# Patient Record
Sex: MALE | Race: WHITE | NOT HISPANIC OR LATINO | URBAN - METROPOLITAN AREA
[De-identification: names, ages, dates, MRNs, and addresses within clinical notes are randomized per-mention and may not be internally consistent; named-entity substitution may affect disease eponyms.]

---

## 2017-11-21 ENCOUNTER — APPOINTMENT (RX ONLY)
Dept: URBAN - METROPOLITAN AREA CLINIC 116 | Facility: CLINIC | Age: 67
Setting detail: DERMATOLOGY
End: 2017-11-21

## 2017-11-21 DIAGNOSIS — L57.0 ACTINIC KERATOSIS: ICD-10-CM

## 2017-11-21 DIAGNOSIS — L56.5 DISSEMINATED SUPERFICIAL ACTINIC POROKERATOSIS (DSAP): ICD-10-CM

## 2017-11-21 DIAGNOSIS — L81.4 OTHER MELANIN HYPERPIGMENTATION: ICD-10-CM

## 2017-11-21 PROBLEM — Q82.8 OTHER SPECIFIED CONGENITAL MALFORMATIONS OF SKIN: Status: ACTIVE | Noted: 2017-11-21

## 2017-11-21 PROCEDURE — ? LIQUID NITROGEN

## 2017-11-21 PROCEDURE — 99214 OFFICE O/P EST MOD 30 MIN: CPT | Mod: 25

## 2017-11-21 PROCEDURE — 17000 DESTRUCT PREMALG LESION: CPT

## 2017-11-21 PROCEDURE — 17003 DESTRUCT PREMALG LES 2-14: CPT

## 2017-11-21 PROCEDURE — ? COUNSELING

## 2017-11-21 ASSESSMENT — LOCATION DETAILED DESCRIPTION DERM
LOCATION DETAILED: LEFT MEDIAL TEMPLE
LOCATION DETAILED: LEFT DISTAL PRETIBIAL REGION
LOCATION DETAILED: RIGHT DISTAL DORSAL FOREARM
LOCATION DETAILED: RIGHT CENTRAL TEMPLE
LOCATION DETAILED: RIGHT SUPERIOR PREAURICULAR CHEEK
LOCATION DETAILED: LEFT PROXIMAL DORSAL FOREARM
LOCATION DETAILED: STERNUM
LOCATION DETAILED: LEFT SUPERIOR OCCIPITAL SCALP
LOCATION DETAILED: RIGHT SUPERIOR MEDIAL UPPER BACK
LOCATION DETAILED: LEFT PROXIMAL PRETIBIAL REGION
LOCATION DETAILED: LEFT POSTERIOR PARIETAL SCALP
LOCATION DETAILED: RIGHT POSTERIOR PARIETAL SCALP
LOCATION DETAILED: POSTERIOR MID-PARIETAL SCALP
LOCATION DETAILED: MID-OCCIPITAL SCALP
LOCATION DETAILED: LEFT ANTERIOR SHOULDER

## 2017-11-21 ASSESSMENT — LOCATION SIMPLE DESCRIPTION DERM
LOCATION SIMPLE: LEFT TEMPLE
LOCATION SIMPLE: RIGHT TEMPLE
LOCATION SIMPLE: LEFT SHOULDER
LOCATION SIMPLE: RIGHT CHEEK
LOCATION SIMPLE: LEFT FOREARM
LOCATION SIMPLE: RIGHT FOREARM
LOCATION SIMPLE: LEFT PRETIBIAL REGION
LOCATION SIMPLE: POSTERIOR SCALP
LOCATION SIMPLE: CHEST
LOCATION SIMPLE: RIGHT UPPER BACK

## 2017-11-21 ASSESSMENT — LOCATION ZONE DERM
LOCATION ZONE: ARM
LOCATION ZONE: LEG
LOCATION ZONE: TRUNK
LOCATION ZONE: SCALP
LOCATION ZONE: FACE

## 2017-11-21 NOTE — PROCEDURE: LIQUID NITROGEN
Number Of Freeze-Thaw Cycles: 1 freeze-thaw cycle
Duration Of Freeze Thaw-Cycle (Seconds): 5
Render Post-Care Instructions In Note?: no
Consent: The patient's consent was obtained including but not limited to risks of crusting, scabbing, blistering, scarring, darker or lighter pigmentary change, recurrence, incomplete removal and infection.
Detail Level: Detailed
Post-Care Instructions: I reviewed with the patient in detail post-care instructions. Patient is to wear sunprotection, and avoid picking at any of the treated lesions. Pt may apply Vaseline to crusted or scabbing areas.

## 2017-11-21 NOTE — PROCEDURE: MIPS QUALITY
Quality 111:Pneumonia Vaccination Status For Older Adults: Pneumococcal Vaccination Previously Received
Detail Level: Detailed
Quality 131: Pain Assessment And Follow-Up: Pain assessment using a standardized tool is documented as negative, no follow-up plan required
Quality 226: Preventive Care And Screening: Tobacco Use: Screening And Cessation Intervention: Patient screened for tobacco and is an ex-smoker
Quality 130: Documentation Of Current Medications In The Medical Record: Current Medications with Name, Dosage, Frequency, or Route not Documented, Reason not Given
Quality 110: Preventive Care And Screening: Influenza Immunization: Influenza Immunization Administered during Influenza season
Quality 47: Advance Care Plan: Advance Care Planning discussed and documented in the medical record; patient did not wish or was not able to name a surrogate decision maker or provide an advance care plan.

## 2018-07-31 ENCOUNTER — RECORDS - HEALTHEAST (OUTPATIENT)
Dept: ADMINISTRATIVE | Facility: OTHER | Age: 68
End: 2018-07-31

## 2018-11-20 ENCOUNTER — APPOINTMENT (RX ONLY)
Dept: URBAN - METROPOLITAN AREA CLINIC 116 | Facility: CLINIC | Age: 68
Setting detail: DERMATOLOGY
End: 2018-11-20

## 2018-11-20 DIAGNOSIS — L56.8 OTHER SPECIFIED ACUTE SKIN CHANGES DUE TO ULTRAVIOLET RADIATION: ICD-10-CM

## 2018-11-20 DIAGNOSIS — L57.0 ACTINIC KERATOSIS: ICD-10-CM

## 2018-11-20 DIAGNOSIS — D22 MELANOCYTIC NEVI: ICD-10-CM

## 2018-11-20 DIAGNOSIS — L81.4 OTHER MELANIN HYPERPIGMENTATION: ICD-10-CM

## 2018-11-20 DIAGNOSIS — L82.0 INFLAMED SEBORRHEIC KERATOSIS: ICD-10-CM

## 2018-11-20 DIAGNOSIS — L56.5 DISSEMINATED SUPERFICIAL ACTINIC POROKERATOSIS (DSAP): ICD-10-CM

## 2018-11-20 DIAGNOSIS — L82.1 OTHER SEBORRHEIC KERATOSIS: ICD-10-CM

## 2018-11-20 PROBLEM — D22.5 MELANOCYTIC NEVI OF TRUNK: Status: ACTIVE | Noted: 2018-11-20

## 2018-11-20 PROBLEM — D22.72 MELANOCYTIC NEVI OF LEFT LOWER LIMB, INCLUDING HIP: Status: ACTIVE | Noted: 2018-11-20

## 2018-11-20 PROCEDURE — ? COUNSELING

## 2018-11-20 PROCEDURE — ? DIAGNOSIS COMMENT

## 2018-11-20 PROCEDURE — ? OBSERVATION

## 2018-11-20 PROCEDURE — 17000 DESTRUCT PREMALG LESION: CPT

## 2018-11-20 PROCEDURE — 17003 DESTRUCT PREMALG LES 2-14: CPT

## 2018-11-20 PROCEDURE — 99214 OFFICE O/P EST MOD 30 MIN: CPT | Mod: 25

## 2018-11-20 PROCEDURE — ? LIQUID NITROGEN

## 2018-11-20 PROCEDURE — ? ORDER FOR PHOTODYNAMIC THERAPY

## 2018-11-20 ASSESSMENT — LOCATION DETAILED DESCRIPTION DERM
LOCATION DETAILED: MID-OCCIPITAL SCALP
LOCATION DETAILED: LEFT PROXIMAL DORSAL FOREARM
LOCATION DETAILED: LEFT SUPERIOR LATERAL NECK
LOCATION DETAILED: RIGHT PROXIMAL DORSAL FOREARM
LOCATION DETAILED: LEFT LATERAL FOREHEAD
LOCATION DETAILED: RIGHT INFERIOR CENTRAL MALAR CHEEK
LOCATION DETAILED: RIGHT ANTERIOR PROXIMAL THIGH
LOCATION DETAILED: RIGHT INFERIOR LATERAL MALAR CHEEK
LOCATION DETAILED: LEFT LATERAL DISTAL PRETIBIAL REGION
LOCATION DETAILED: LEFT INFERIOR MEDIAL MIDBACK
LOCATION DETAILED: POSTERIOR MID-PARIETAL SCALP
LOCATION DETAILED: RIGHT DISTAL CALF
LOCATION DETAILED: LEFT POSTERIOR PARIETAL SCALP
LOCATION DETAILED: LEFT SUPERIOR POSTERIOR PARIETAL SCALP
LOCATION DETAILED: LEFT DORSAL FOOT
LOCATION DETAILED: LEFT MEDIAL MANDIBULAR CHEEK
LOCATION DETAILED: RIGHT INFERIOR UPPER BACK
LOCATION DETAILED: LEFT SUPERIOR LATERAL FOREHEAD
LOCATION DETAILED: LEFT DISTAL PRETIBIAL REGION
LOCATION DETAILED: RIGHT INFERIOR VERMILION LIP
LOCATION DETAILED: LEFT PROXIMAL POSTERIOR UPPER ARM
LOCATION DETAILED: INFERIOR THORACIC SPINE
LOCATION DETAILED: RIGHT POSTERIOR SHOULDER

## 2018-11-20 ASSESSMENT — LOCATION SIMPLE DESCRIPTION DERM
LOCATION SIMPLE: LEFT UPPER ARM
LOCATION SIMPLE: RIGHT CALF
LOCATION SIMPLE: LEFT FOOT
LOCATION SIMPLE: LEFT ANTERIOR NECK
LOCATION SIMPLE: LEFT FOREARM
LOCATION SIMPLE: LEFT FOREHEAD
LOCATION SIMPLE: RIGHT THIGH
LOCATION SIMPLE: RIGHT SHOULDER
LOCATION SIMPLE: UPPER BACK
LOCATION SIMPLE: POSTERIOR SCALP
LOCATION SIMPLE: LEFT CHEEK
LOCATION SIMPLE: RIGHT UPPER BACK
LOCATION SIMPLE: RIGHT FOREARM
LOCATION SIMPLE: LEFT PRETIBIAL REGION
LOCATION SIMPLE: LEFT LOWER BACK
LOCATION SIMPLE: RIGHT LIP
LOCATION SIMPLE: RIGHT CHEEK

## 2018-11-20 ASSESSMENT — LOCATION ZONE DERM
LOCATION ZONE: FACE
LOCATION ZONE: LIP
LOCATION ZONE: NECK
LOCATION ZONE: ARM
LOCATION ZONE: LEG
LOCATION ZONE: SCALP
LOCATION ZONE: FEET
LOCATION ZONE: TRUNK

## 2018-11-20 NOTE — PROCEDURE: ORDER FOR PHOTODYNAMIC THERAPY
Pdt Type: 417nm LED
Arms And Hands Incubation Time: 2 Hours
Face Incubation Time: 60min
Detail Level: Zone
Location: Face and Scalp
Face And Neck Incubation Time: 1 Hour
Consent: The procedure and risks were reviewed with the patient including but not limited to: burning, pigmentary changes, pain, blistering, scabbing, redness, and the possibility of needing numerous treatments. Strict photoprotection after the procedure was also discussed. No hx of cold sores, fever blisters, or herpes.
Scalp Incubation Time: 90 mins
Face And Scalp Incubation Time: 1 Hour for the face and 1 Hours for the scalp
Frequency Of Pdt: Single Treatment

## 2018-11-20 NOTE — PROCEDURE: LIQUID NITROGEN
Number Of Freeze-Thaw Cycles: 1 freeze-thaw cycle
Post-Care Instructions: I reviewed with the patient in detail post-care instructions. Patient is to wear sunprotection, and avoid picking at any of the treated lesions. Pt may apply Vaseline to crusted or scabbing areas.
Consent: The patient's consent was obtained including but not limited to risks of crusting, scabbing, blistering, scarring, darker or lighter pigmentary change, recurrence, incomplete removal and infection.
Duration Of Freeze Thaw-Cycle (Seconds): 5
Detail Level: Detailed
Render Post-Care Instructions In Note?: no

## 2018-11-20 NOTE — HPI: EVALUATION OF SKIN LESION(S)
How Severe Are Your Spot(S)?: mild
Have Your Spot(S) Been Treated In The Past?: has not been treated
Hpi Title: Evaluation of Skin Lesions
Additional History: Spot on lip that splits while exposed to sun or dry weather. Always in same spot on lower lip.

## 2019-11-26 ENCOUNTER — APPOINTMENT (RX ONLY)
Dept: URBAN - METROPOLITAN AREA CLINIC 116 | Facility: CLINIC | Age: 69
Setting detail: DERMATOLOGY
End: 2019-11-26

## 2019-11-26 DIAGNOSIS — L81.4 OTHER MELANIN HYPERPIGMENTATION: ICD-10-CM

## 2019-11-26 DIAGNOSIS — L74.51 PRIMARY FOCAL HYPERHIDROSIS: ICD-10-CM

## 2019-11-26 DIAGNOSIS — L82.1 OTHER SEBORRHEIC KERATOSIS: ICD-10-CM

## 2019-11-26 DIAGNOSIS — D22 MELANOCYTIC NEVI: ICD-10-CM

## 2019-11-26 DIAGNOSIS — Z85.828 PERSONAL HISTORY OF OTHER MALIGNANT NEOPLASM OF SKIN: ICD-10-CM

## 2019-11-26 DIAGNOSIS — L57.0 ACTINIC KERATOSIS: ICD-10-CM

## 2019-11-26 PROBLEM — L74.519 PRIMARY FOCAL HYPERHIDROSIS, UNSPECIFIED: Status: ACTIVE | Noted: 2019-11-26

## 2019-11-26 PROBLEM — D22.5 MELANOCYTIC NEVI OF TRUNK: Status: ACTIVE | Noted: 2019-11-26

## 2019-11-26 PROCEDURE — ? LIQUID NITROGEN

## 2019-11-26 PROCEDURE — ? OTHER

## 2019-11-26 PROCEDURE — ? COUNSELING

## 2019-11-26 PROCEDURE — 99214 OFFICE O/P EST MOD 30 MIN: CPT | Mod: 25

## 2019-11-26 PROCEDURE — 17003 DESTRUCT PREMALG LES 2-14: CPT

## 2019-11-26 PROCEDURE — 17000 DESTRUCT PREMALG LESION: CPT

## 2019-11-26 ASSESSMENT — LOCATION SIMPLE DESCRIPTION DERM
LOCATION SIMPLE: UPPER BACK
LOCATION SIMPLE: RIGHT BUTTOCK
LOCATION SIMPLE: RIGHT POSTERIOR THIGH
LOCATION SIMPLE: RIGHT THIGH
LOCATION SIMPLE: LEFT SCALP
LOCATION SIMPLE: SCALP
LOCATION SIMPLE: RIGHT FOREARM
LOCATION SIMPLE: CHEST
LOCATION SIMPLE: RIGHT UPPER BACK

## 2019-11-26 ASSESSMENT — LOCATION ZONE DERM
LOCATION ZONE: ARM
LOCATION ZONE: SCALP
LOCATION ZONE: LEG
LOCATION ZONE: TRUNK

## 2019-11-26 ASSESSMENT — LOCATION DETAILED DESCRIPTION DERM
LOCATION DETAILED: RIGHT INFERIOR UPPER BACK
LOCATION DETAILED: LEFT CENTRAL FRONTAL SCALP
LOCATION DETAILED: RIGHT ANTERIOR PROXIMAL THIGH
LOCATION DETAILED: INFERIOR THORACIC SPINE
LOCATION DETAILED: RIGHT BUTTOCK
LOCATION DETAILED: RIGHT PROXIMAL DORSAL FOREARM
LOCATION DETAILED: RIGHT CENTRAL PARIETAL SCALP
LOCATION DETAILED: RIGHT DISTAL POSTERIOR THIGH
LOCATION DETAILED: LEFT CENTRAL PARIETAL SCALP
LOCATION DETAILED: STERNUM

## 2019-11-26 NOTE — PROCEDURE: MIPS QUALITY
Detail Level: Simple
Additional Notes: Patient states on a scale of 0-10 pain level is 0.
Quality 130: Documentation Of Current Medications In The Medical Record: Current Medications Documented
Quality 131: Pain Assessment And Follow-Up: Pain assessment using a standardized tool is documented as negative, no follow-up plan required

## 2019-11-26 NOTE — PROCEDURE: COUNSELING
Detail Level: Simple
Detail Level: Zone
Medical Necessity Information: LCD Guidelines vary from payer to payer. Please check with your payer's policy to determine medical necessity.

## 2019-11-26 NOTE — PROCEDURE: OTHER
Note Text (......Xxx Chief Complaint.): This diagnosis correlates with the
Other (Free Text): Pt has PDT tx scheduled for Josef 2,3 2020 so the freezing has been limited to thicker areas
Detail Level: Simple

## 2020-01-02 ENCOUNTER — APPOINTMENT (RX ONLY)
Dept: URBAN - METROPOLITAN AREA CLINIC 116 | Facility: CLINIC | Age: 70
Setting detail: DERMATOLOGY
End: 2020-01-02

## 2020-01-02 DIAGNOSIS — L57.0 ACTINIC KERATOSIS: ICD-10-CM

## 2020-01-02 PROCEDURE — 96567 PDT DSTR PRMLG LES SKN: CPT

## 2020-01-02 PROCEDURE — ? PDT: BLUE

## 2020-01-02 ASSESSMENT — LOCATION ZONE DERM
LOCATION ZONE: NOSE
LOCATION ZONE: LIP
LOCATION ZONE: FACE

## 2020-01-02 ASSESSMENT — LOCATION SIMPLE DESCRIPTION DERM
LOCATION SIMPLE: LEFT CHEEK
LOCATION SIMPLE: RIGHT CHEEK
LOCATION SIMPLE: RIGHT FOREHEAD
LOCATION SIMPLE: CHIN
LOCATION SIMPLE: NOSE
LOCATION SIMPLE: UPPER LIP
LOCATION SIMPLE: LEFT FOREHEAD

## 2020-01-02 ASSESSMENT — LOCATION DETAILED DESCRIPTION DERM
LOCATION DETAILED: NASAL DORSUM
LOCATION DETAILED: RIGHT CHIN
LOCATION DETAILED: RIGHT FOREHEAD
LOCATION DETAILED: LEFT FOREHEAD
LOCATION DETAILED: RIGHT CENTRAL MALAR CHEEK
LOCATION DETAILED: PHILTRUM
LOCATION DETAILED: LEFT CENTRAL MALAR CHEEK

## 2020-01-02 NOTE — PROCEDURE: PDT: BLUE
Medical Necessity: Precancerous Lesions
Illumination Time: 5 minutes
Detail Level: Zone
Incubation End Time: 10:30
Lot # (Optional): [id number]
Pre-Procedure Text: The treatment areas were cleaned and prepped in the usual fashion.
Ndc# (Optional): [id number]
Was Levulan/Ameluz Applied On A Previous Day?: No
Who Performed The Pdt (Staff): Jessy Wilkes
Expiration Date (Optional): 3/21
Show Medical Necessity In Plan?: Yes
Who Performed The Pdt?: Performed by Nurse, MA or Katherine Galicia 1949 ([id number])
Post-Care Instructions: I reviewed with the patient in detail post-care instructions. Patient is to avoid sunlight for the next 2 days, and wear sun protection. Patients may expect sunburn like redness, discomfort and scabbing.
Consent: Written consent obtained. The risks were reviewed with the patient including but not limited to: pigmentary changes, pain, blistering, scabbing, redness, and the remote possibility of scarring.
Light Source: 415nm
Treatment Number: 1
Incubation Time: 60 minutes
Which Photosensitizer Was Used: Levulan
Total Number Of Aks Treated (Optional To Report): 0
Incubation Start Time: 9:30
Anesthesia Type: 1% lidocaine with epinephrine

## 2020-01-03 ENCOUNTER — APPOINTMENT (RX ONLY)
Dept: URBAN - METROPOLITAN AREA CLINIC 121 | Facility: CLINIC | Age: 70
Setting detail: DERMATOLOGY
End: 2020-01-03

## 2020-01-03 DIAGNOSIS — L57.0 ACTINIC KERATOSIS: ICD-10-CM

## 2020-01-03 PROCEDURE — 96567 PDT DSTR PRMLG LES SKN: CPT

## 2020-01-03 PROCEDURE — ? COUNSELING

## 2020-01-03 PROCEDURE — ? PDT: BLUE

## 2020-01-03 ASSESSMENT — LOCATION DETAILED DESCRIPTION DERM: LOCATION DETAILED: MID-FRONTAL SCALP

## 2020-01-03 ASSESSMENT — LOCATION SIMPLE DESCRIPTION DERM: LOCATION SIMPLE: ANTERIOR SCALP

## 2020-01-03 ASSESSMENT — LOCATION ZONE DERM: LOCATION ZONE: SCALP

## 2020-01-03 NOTE — PROCEDURE: PDT: BLUE
Who Performed The Pdt (Staff): [facility] [employer]
Lot # (Optional): [id number]
Incubation End Time: 2:30
Which Photosensitizer Was Used: Levulan
Number Of Kerasticks/Tubes Billed For: 1
Show Anesthesia In Plan?: Yes
Post-Care Instructions: I reviewed with the patient in detail post-care instructions. Patient is to avoid sunlight for the next 2 days, and wear sun protection. Patients may expect sunburn like redness, discomfort and scabbing.
Total Number Of Aks Treated (Optional To Report): 0
Incubation Start Time: 2:20
Anesthesia Type: 1% lidocaine with epinephrine
Who Performed The Pdt?: Performed by Nurse, MA or Katherine Galicia 1949 ([id number])
Consent: Written consent obtained. The risks were reviewed with the patient including but not limited to: pigmentary changes, pain, blistering, scabbing, redness, and the remote possibility of scarring.
Occlusion: No
Pre-Procedure Text: The treatment areas were cleaned and prepped in the usual fashion.
Ndc# (Optional): 40596-331-46
Incubation Time: 60 minutes
Light Source: Dalton-U
Detail Level: Zone
Medical Necessity: Precancerous Lesions
Expiration Date (Optional): 03/21
Debridement Text (Will Only Render In Visit Note If You Select Debridement Option Under Who Performed The Pdt Field): Prior to application of the photodynamic medication the hyperkeratotic lesions were curetted to make them more amenable to therapy.
Illumination Time: 00:16:40

## 2020-11-17 ENCOUNTER — APPOINTMENT (RX ONLY)
Dept: URBAN - METROPOLITAN AREA CLINIC 116 | Facility: CLINIC | Age: 70
Setting detail: DERMATOLOGY
End: 2020-11-17

## 2020-11-17 VITALS — TEMPERATURE: 97.2 F

## 2020-11-17 DIAGNOSIS — L82.1 OTHER SEBORRHEIC KERATOSIS: ICD-10-CM

## 2020-11-17 DIAGNOSIS — L56.5 DISSEMINATED SUPERFICIAL ACTINIC POROKERATOSIS (DSAP): ICD-10-CM

## 2020-11-17 DIAGNOSIS — Z85.828 PERSONAL HISTORY OF OTHER MALIGNANT NEOPLASM OF SKIN: ICD-10-CM

## 2020-11-17 DIAGNOSIS — L85.3 XEROSIS CUTIS: ICD-10-CM

## 2020-11-17 DIAGNOSIS — L81.4 OTHER MELANIN HYPERPIGMENTATION: ICD-10-CM

## 2020-11-17 DIAGNOSIS — L57.0 ACTINIC KERATOSIS: ICD-10-CM

## 2020-11-17 DIAGNOSIS — D22 MELANOCYTIC NEVI: ICD-10-CM

## 2020-11-17 PROBLEM — D22.5 MELANOCYTIC NEVI OF TRUNK: Status: ACTIVE | Noted: 2020-11-17

## 2020-11-17 PROCEDURE — 17000 DESTRUCT PREMALG LESION: CPT

## 2020-11-17 PROCEDURE — ? COUNSELING

## 2020-11-17 PROCEDURE — ? LIQUID NITROGEN

## 2020-11-17 PROCEDURE — 17003 DESTRUCT PREMALG LES 2-14: CPT

## 2020-11-17 PROCEDURE — 99214 OFFICE O/P EST MOD 30 MIN: CPT | Mod: 25

## 2020-11-17 ASSESSMENT — LOCATION DETAILED DESCRIPTION DERM
LOCATION DETAILED: LEFT DISTAL DORSAL FOREARM
LOCATION DETAILED: LEFT PROXIMAL PRETIBIAL REGION
LOCATION DETAILED: RIGHT MEDIAL SUPERIOR CHEST
LOCATION DETAILED: LEFT POSTERIOR ANKLE
LOCATION DETAILED: RIGHT LATERAL SUPERIOR CHEST
LOCATION DETAILED: RIGHT PROXIMAL PRETIBIAL REGION
LOCATION DETAILED: RIGHT DISTAL PRETIBIAL REGION
LOCATION DETAILED: LEFT POSTERIOR SHOULDER
LOCATION DETAILED: LEFT AXILLARY VAULT
LOCATION DETAILED: RIGHT BUTTOCK
LOCATION DETAILED: LEFT CENTRAL BUCCAL CHEEK
LOCATION DETAILED: RIGHT MEDIAL FRONTAL SCALP
LOCATION DETAILED: RIGHT SUPERIOR PARIETAL SCALP
LOCATION DETAILED: RIGHT DISTAL CALF
LOCATION DETAILED: RIGHT SUPERIOR FOREHEAD
LOCATION DETAILED: RIGHT PROXIMAL DORSAL FOREARM
LOCATION DETAILED: RIGHT INFERIOR LATERAL MALAR CHEEK
LOCATION DETAILED: RIGHT CENTRAL FRONTAL SCALP
LOCATION DETAILED: LEFT DISTAL PRETIBIAL REGION
LOCATION DETAILED: LEFT DISTAL CALF
LOCATION DETAILED: LEFT RADIAL DORSAL HAND

## 2020-11-17 ASSESSMENT — LOCATION ZONE DERM
LOCATION ZONE: FACE
LOCATION ZONE: ARM
LOCATION ZONE: TRUNK
LOCATION ZONE: AXILLAE
LOCATION ZONE: LEG
LOCATION ZONE: SCALP
LOCATION ZONE: HAND

## 2020-11-17 ASSESSMENT — LOCATION SIMPLE DESCRIPTION DERM
LOCATION SIMPLE: LEFT CHEEK
LOCATION SIMPLE: LEFT AXILLARY VAULT
LOCATION SIMPLE: LEFT HAND
LOCATION SIMPLE: SCALP
LOCATION SIMPLE: RIGHT CALF
LOCATION SIMPLE: RIGHT SCALP
LOCATION SIMPLE: RIGHT FOREHEAD
LOCATION SIMPLE: RIGHT CHEEK
LOCATION SIMPLE: LEFT ANKLE
LOCATION SIMPLE: CHEST
LOCATION SIMPLE: LEFT PRETIBIAL REGION
LOCATION SIMPLE: LEFT FOREARM
LOCATION SIMPLE: RIGHT BUTTOCK
LOCATION SIMPLE: RIGHT FOREARM
LOCATION SIMPLE: RIGHT PRETIBIAL REGION
LOCATION SIMPLE: LEFT SHOULDER
LOCATION SIMPLE: LEFT CALF

## 2021-05-29 ENCOUNTER — RECORDS - HEALTHEAST (OUTPATIENT)
Dept: ADMINISTRATIVE | Facility: CLINIC | Age: 71
End: 2021-05-29

## 2021-05-30 ENCOUNTER — RECORDS - HEALTHEAST (OUTPATIENT)
Dept: ADMINISTRATIVE | Facility: CLINIC | Age: 71
End: 2021-05-30

## 2021-05-31 ENCOUNTER — RECORDS - HEALTHEAST (OUTPATIENT)
Dept: ADMINISTRATIVE | Facility: CLINIC | Age: 71
End: 2021-05-31

## 2021-06-02 ENCOUNTER — RECORDS - HEALTHEAST (OUTPATIENT)
Dept: ADMINISTRATIVE | Facility: CLINIC | Age: 71
End: 2021-06-02

## 2021-06-09 ENCOUNTER — RECORDS - HEALTHEAST (OUTPATIENT)
Dept: ADMINISTRATIVE | Facility: CLINIC | Age: 71
End: 2021-06-09

## 2021-11-15 ENCOUNTER — APPOINTMENT (RX ONLY)
Dept: URBAN - METROPOLITAN AREA CLINIC 116 | Facility: CLINIC | Age: 71
Setting detail: DERMATOLOGY
End: 2021-11-15

## 2021-11-15 DIAGNOSIS — Z85.828 PERSONAL HISTORY OF OTHER MALIGNANT NEOPLASM OF SKIN: ICD-10-CM

## 2021-11-15 DIAGNOSIS — L82.1 OTHER SEBORRHEIC KERATOSIS: ICD-10-CM

## 2021-11-15 DIAGNOSIS — L81.4 OTHER MELANIN HYPERPIGMENTATION: ICD-10-CM

## 2021-11-15 DIAGNOSIS — D49.2 NEOPLASM OF UNSPECIFIED BEHAVIOR OF BONE, SOFT TISSUE, AND SKIN: ICD-10-CM

## 2021-11-15 DIAGNOSIS — L57.8 OTHER SKIN CHANGES DUE TO CHRONIC EXPOSURE TO NONIONIZING RADIATION: ICD-10-CM

## 2021-11-15 DIAGNOSIS — L57.0 ACTINIC KERATOSIS: ICD-10-CM

## 2021-11-15 PROCEDURE — 11102 TANGNTL BX SKIN SINGLE LES: CPT

## 2021-11-15 PROCEDURE — 99213 OFFICE O/P EST LOW 20 MIN: CPT | Mod: 25

## 2021-11-15 PROCEDURE — ? ORDER FOR PHOTODYNAMIC THERAPY

## 2021-11-15 PROCEDURE — ? SUNSCREEN RECOMMENDATIONS

## 2021-11-15 PROCEDURE — ? COUNSELING

## 2021-11-15 PROCEDURE — ? BIOPSY BY SHAVE METHOD

## 2021-11-15 PROCEDURE — 17003 DESTRUCT PREMALG LES 2-14: CPT

## 2021-11-15 PROCEDURE — 17000 DESTRUCT PREMALG LESION: CPT | Mod: 59

## 2021-11-15 PROCEDURE — ? LIQUID NITROGEN

## 2021-11-15 PROCEDURE — ? OBSERVATION

## 2021-11-15 ASSESSMENT — LOCATION DETAILED DESCRIPTION DERM
LOCATION DETAILED: EPIGASTRIC SKIN
LOCATION DETAILED: RIGHT CENTRAL FRONTAL SCALP
LOCATION DETAILED: RIGHT MEDIAL UPPER BACK
LOCATION DETAILED: LEFT CENTRAL MALAR CHEEK
LOCATION DETAILED: RIGHT SUPERIOR PARIETAL SCALP
LOCATION DETAILED: LEFT CENTRAL FRONTAL SCALP
LOCATION DETAILED: LEFT PROXIMAL PRETIBIAL REGION
LOCATION DETAILED: RIGHT INFERIOR UPPER BACK
LOCATION DETAILED: STERNUM
LOCATION DETAILED: LEFT SUPERIOR PARIETAL SCALP

## 2021-11-15 ASSESSMENT — LOCATION ZONE DERM
LOCATION ZONE: LEG
LOCATION ZONE: SCALP
LOCATION ZONE: TRUNK
LOCATION ZONE: FACE

## 2021-11-15 ASSESSMENT — LOCATION SIMPLE DESCRIPTION DERM
LOCATION SIMPLE: RIGHT SCALP
LOCATION SIMPLE: ABDOMEN
LOCATION SIMPLE: SCALP
LOCATION SIMPLE: LEFT PRETIBIAL REGION
LOCATION SIMPLE: LEFT CHEEK
LOCATION SIMPLE: RIGHT UPPER BACK
LOCATION SIMPLE: LEFT SCALP
LOCATION SIMPLE: CHEST

## 2021-11-15 NOTE — PROCEDURE: SUNSCREEN RECOMMENDATIONS
Detail Level: Generalized
Products Recommended: Zinc sunscreen.
General Sunscreen Counseling: I recommended a broad spectrum sunscreen with a SPF of 30 or higher. I explained that SPF 30 sunscreens block approximately 97 percent of the sun's harmful rays. Sunscreens should be applied at least 15 minutes prior to expected sun exposure and then every 2 hours after that as long as sun exposure continues. If swimming or exercising sunscreen should be reapplied every 45 minutes to an hour after getting wet or sweating. One ounce, or the equivalent of a shot glass full of sunscreen, is adequate to protect the skin not covered by a bathing suit. I also recommended a lip balm with a sunscreen as well. Sun protective clothing can be used in lieu of sunscreen but must be worn the entire time you are exposed to the sun's rays.

## 2021-11-15 NOTE — PROCEDURE: LIQUID NITROGEN
Render Post-Care Instructions In Note?: yes
Number Of Freeze-Thaw Cycles: 3 freeze-thaw cycles
Duration Of Freeze Thaw-Cycle (Seconds): 3
Consent: The patient's consent was obtained including but not limited to risks of crusting, scabbing, blistering, scarring, darker or lighter pigmentary change, recurrence, incomplete removal and infection.
Post-Care Instructions: I reviewed with the patient in detail post-care instructions. Patient is to wear sunprotection, and avoid picking at any of the treated lesions. Pt may apply Vaseline to crusted or scabbing areas. Patient has also received a handout with instructions on caring for the wound and office contact information.
Detail Level: Detailed

## 2021-11-15 NOTE — PROCEDURE: ORDER FOR PHOTODYNAMIC THERAPY
Face, Ears And  Scalp Incubation Time: 1 Hour
Arms Incubation Time: 3 hours
Back Incubation Time: 2 Hours
Occlusion: No
Detail Level: Detailed
Frequency Of Pdt: Single Treatment
Consent: The procedure and risks were reviewed with the patient including but not limited to: burning, pigmentary changes, pain, blistering, scabbing, redness, and the possibility of needing numerous treatments. Strict photoprotection after the procedure was also discussed.
Face And Neck Incubation Time: 2 hour
Face And Ears Incubation Time: 90 min
Chest Incubation Time: 2hr
Face And Scalp Incubation Time: 2 Hour for the face and 2 Hours for the scalp
Location: Face
Photosensitizer: Levulan
Pdt Type: HAMIDA-U
Location: Scalp

## 2021-11-15 NOTE — PROCEDURE: BIOPSY BY SHAVE METHOD
Detail Level: Simple
Depth Of Biopsy: dermis
Was A Bandage Applied: Yes
Size Of Lesion In Cm: 0
Biopsy Type: H and E
Biopsy Method: Personna blade
Anesthesia Type: 1% lidocaine with epinephrine and a 1:10 solution of 8.4% sodium bicarbonate
Anesthesia Volume In Cc (Will Not Render If 0): 0.5
Hemostasis: Aluminum Chloride
Wound Care: Vaseline
Dressing: pressure dressing with telfa
Destruction After The Procedure: No
Type Of Destruction Used: Curettage
Cryotherapy Text: The wound bed was treated with cryotherapy after the biopsy was performed.
Electrodesiccation Text: The wound bed was treated with electrodesiccation after the biopsy was performed.
Electrodesiccation And Curettage Text: The wound bed was treated with electrodesiccation and curettage after the biopsy was performed.
Silver Nitrate Text: The wound bed was treated with silver nitrate after the biopsy was performed.
Lab: [facility]
Lab Facility: [address] [facility]
Path Notes (To The Dermatopathologist): Please check margins
Consent: The provider's intent is to obtain a tissue sample solely for diagnostic purposes. Written consent to obtain tissue sample was obtained and risks were reviewed including but not limited to scarring, infection, bleeding, scabbing, incomplete removal, nerve damage and allergy to anesthesia.
Post-Care Instructions: Clean the wound with antibacterial soap and water twice a day. If crust develops, soak with moist gauze. Apply a thin layer of Vaseline to the area twice a day until wound is healed. Cover with a clean band-aid\\nBleeding is rare, but if it should occur, apply direct pressure to the bleeding site for a full 15 minutes without easing up. If the bleeding continues, despite your efforts, please call the office. If it is after hours, call [phone] to speak to a provider. It may be necessary to go to the emergency room. Patient also received handout.
Notification Instructions: Patient will be notified of biopsy results. However, patient instructed to call the office if not contacted within 2 weeks.
Billing Type: [employer]
Information: Selecting Yes will display possible errors in your note based on the variables you have selected. This validation is only offered as a suggestion for you. PLEASE NOTE THAT THE VALIDATION TEXT WILL BE REMOVED WHEN YOU FINALIZE YOUR NOTE. IF YOU WANT TO FAX A PRELIMINARY NOTE YOU WILL NEED TO TOGGLE THIS TO 'NO' IF YOU DO NOT WANT IT IN YOUR FAXED NOTE.

## 2022-04-19 ENCOUNTER — APPOINTMENT (RX ONLY)
Dept: URBAN - METROPOLITAN AREA CLINIC 116 | Facility: CLINIC | Age: 72
Setting detail: DERMATOLOGY
End: 2022-04-19

## 2022-04-19 DIAGNOSIS — L57.0 ACTINIC KERATOSIS: ICD-10-CM

## 2022-04-19 PROCEDURE — ? PDT: BLUE

## 2022-04-19 PROCEDURE — 96567 PDT DSTR PRMLG LES SKN: CPT

## 2022-04-19 ASSESSMENT — LOCATION ZONE DERM
LOCATION ZONE: SCALP
LOCATION ZONE: FACE

## 2022-04-19 ASSESSMENT — LOCATION SIMPLE DESCRIPTION DERM
LOCATION SIMPLE: SCALP
LOCATION SIMPLE: RIGHT FOREHEAD
LOCATION SIMPLE: LEFT SCALP

## 2022-04-19 ASSESSMENT — LOCATION DETAILED DESCRIPTION DERM
LOCATION DETAILED: RIGHT SUPERIOR FOREHEAD
LOCATION DETAILED: LEFT SUPERIOR PARIETAL SCALP
LOCATION DETAILED: LEFT CENTRAL FRONTAL SCALP
LOCATION DETAILED: RIGHT CENTRAL PARIETAL SCALP
LOCATION DETAILED: LEFT CENTRAL PARIETAL SCALP

## 2022-04-19 NOTE — PROCEDURE: PDT: BLUE
Who Performed The Pdt?: Performed by Nurse, MA or Katherine Galicia 1949 ([id number])
Detail Level: Zone
Show Inventory Tab: Hide
Debridement Text (Will Only Render In Visit Note If You Select Debridement Option Under Who Performed The Pdt Field): Prior to application of the photodynamic medication the hyperkeratotic lesions were curetted to make them more amenable to therapy.
Illumination Time: 00:16:40
Eye Protection Applied?: Yes
Medical Necessity: Precancerous Lesions
Light Source: Dalton-U
Was Levulan/Ameluz Applied On A Previous Day?: No
Expiration Date (Optional): 12/25
Incubation Time: 2 hours
Pre-Procedure Text: The treatment areas were cleaned and prepped in the usual fashion.
Consent: Written consent obtained. The risks were reviewed with the patient including but not limited to: pigmentary changes, pain, blistering, scabbing, redness, and the remote possibility of scarring.
Ndc# (Optional): [id number]
Incubation Start Time: 9:00
Anesthesia Type: normal saline
Total Number Of Aks Treated (Optional To Report): 0
Treatment Number: 1
Which Photosensitizer Was Used: Levulan
Post-Care Instructions: I reviewed with the patient in detail post-care instructions. Patient is to avoid sunlight for the next 2 days, and wear sun protection. Patients may expect sunburn like redness, discomfort and scabbing.
Incubation End Time: 11:00
Lot # (Optional): [id number]

## 2022-04-27 ENCOUNTER — APPOINTMENT (RX ONLY)
Dept: URBAN - METROPOLITAN AREA CLINIC 116 | Facility: CLINIC | Age: 72
Setting detail: DERMATOLOGY
End: 2022-04-27

## 2022-04-27 DIAGNOSIS — L57.0 ACTINIC KERATOSIS: ICD-10-CM

## 2022-04-27 PROCEDURE — 96567 PDT DSTR PRMLG LES SKN: CPT

## 2022-04-27 PROCEDURE — ? PDT: BLUE

## 2022-04-27 ASSESSMENT — LOCATION SIMPLE DESCRIPTION DERM
LOCATION SIMPLE: UPPER LIP
LOCATION SIMPLE: RIGHT CHEEK
LOCATION SIMPLE: RIGHT FOREHEAD
LOCATION SIMPLE: NOSE
LOCATION SIMPLE: LEFT CHEEK
LOCATION SIMPLE: LEFT FOREHEAD
LOCATION SIMPLE: CHIN

## 2022-04-27 ASSESSMENT — LOCATION DETAILED DESCRIPTION DERM
LOCATION DETAILED: LEFT CENTRAL MALAR CHEEK
LOCATION DETAILED: PHILTRUM
LOCATION DETAILED: LEFT MEDIAL FOREHEAD
LOCATION DETAILED: RIGHT CHIN
LOCATION DETAILED: NASAL DORSUM
LOCATION DETAILED: RIGHT FOREHEAD
LOCATION DETAILED: RIGHT CENTRAL MALAR CHEEK

## 2022-04-27 ASSESSMENT — LOCATION ZONE DERM
LOCATION ZONE: NOSE
LOCATION ZONE: LIP
LOCATION ZONE: FACE

## 2022-04-27 NOTE — PROCEDURE: PDT: BLUE
Consent: Written consent obtained. The risks were reviewed with the patient including but not limited to: pigmentary changes, pain, blistering, scabbing, redness, and the remote possibility of scarring.
Anesthesia Type: 1% lidocaine with epinephrine
Incubation Start Time: 9:30
Pre-Procedure Text: The treatment areas were cleaned and prepped in the usual fashion.
Incubation Time: 90 minutes
Detail Level: Detailed
Ndc# (Optional): [phone]
Show Anesthesia In Plan?: Yes
Number Of Kerasticks/Tubes Billed For: 1
Incubation End Time: 11:00
Post-Care Instructions: I reviewed with the patient in detail post-care instructions. Patient is to avoid sunlight for the next 2 days, and wear sun protection. Patients may expect sunburn like redness, discomfort and scabbing.
Which Photosensitizer Was Used: Levulan
Who Performed The Pdt?: Performed by Nurse, MA or Katherine Galicia [id number] ([id number])
Total Number Of Aks Treated (Optional To Report): 0
Occlusion: No
Medical Necessity: Precancerous Lesions
Show Inventory Tab: Hide
Illumination Time: 16.40
Lot # (Optional): [id number]
Expiration Date (Optional): 12/25
Light Source: [place]-U
Who Performed The Pdt (Staff): Nanette Yoon

## 2022-05-03 ENCOUNTER — TRANSFERRED RECORDS (OUTPATIENT)
Dept: HEALTH INFORMATION MANAGEMENT | Facility: CLINIC | Age: 72
End: 2022-05-03

## 2022-11-23 ENCOUNTER — TRANSFERRED RECORDS (OUTPATIENT)
Dept: HEALTH INFORMATION MANAGEMENT | Facility: CLINIC | Age: 72
End: 2022-11-23

## 2022-11-28 ENCOUNTER — TRANSFERRED RECORDS (OUTPATIENT)
Dept: HEALTH INFORMATION MANAGEMENT | Facility: CLINIC | Age: 72
End: 2022-11-28

## 2022-11-28 ENCOUNTER — LAB (OUTPATIENT)
Dept: LAB | Facility: CLINIC | Age: 72
End: 2022-11-28
Payer: MEDICARE

## 2022-11-28 DIAGNOSIS — R97.20 ELEVATED PROSTATE SPECIFIC ANTIGEN (PSA): ICD-10-CM

## 2022-11-28 DIAGNOSIS — R97.20 ELEVATED PSA: ICD-10-CM

## 2022-11-28 DIAGNOSIS — Z12.5 ENCOUNTER FOR SCREENING FOR MALIGNANT NEOPLASM OF PROSTATE: ICD-10-CM

## 2022-11-28 DIAGNOSIS — E78.2 MIXED HYPERLIPIDEMIA: ICD-10-CM

## 2022-11-28 DIAGNOSIS — E03.9 HYPOTHYROIDISM, UNSPECIFIED: ICD-10-CM

## 2022-11-28 DIAGNOSIS — E78.2 MIXED HYPERLIPIDEMIA: Primary | ICD-10-CM

## 2022-11-28 LAB
ALBUMIN SERPL BCG-MCNC: 4.1 G/DL (ref 3.5–5.2)
ALP SERPL-CCNC: 76 U/L (ref 40–129)
ALT SERPL W P-5'-P-CCNC: 30 U/L (ref 10–50)
ANION GAP SERPL CALCULATED.3IONS-SCNC: 12 MMOL/L (ref 7–15)
AST SERPL W P-5'-P-CCNC: 45 U/L (ref 10–50)
BASOPHILS # BLD AUTO: 0 10E3/UL (ref 0–0.2)
BASOPHILS NFR BLD AUTO: 0 %
BILIRUB SERPL-MCNC: 0.6 MG/DL
BUN SERPL-MCNC: 16.9 MG/DL (ref 8–23)
CALCIUM SERPL-MCNC: 8.9 MG/DL (ref 8.8–10.2)
CHLORIDE SERPL-SCNC: 105 MMOL/L (ref 98–107)
CHOLEST SERPL-MCNC: 128 MG/DL
CREAT SERPL-MCNC: 0.81 MG/DL (ref 0.67–1.17)
DEPRECATED HCO3 PLAS-SCNC: 21 MMOL/L (ref 22–29)
EOSINOPHIL # BLD AUTO: 0.4 10E3/UL (ref 0–0.7)
EOSINOPHIL NFR BLD AUTO: 4 %
ERYTHROCYTE [DISTWIDTH] IN BLOOD BY AUTOMATED COUNT: 14.5 % (ref 10–15)
GFR SERPL CREATININE-BSD FRML MDRD: >90 ML/MIN/1.73M2
GLUCOSE SERPL-MCNC: 97 MG/DL (ref 70–99)
HCT VFR BLD AUTO: 42.3 % (ref 40–53)
HDLC SERPL-MCNC: 59 MG/DL
HGB BLD-MCNC: 14.5 G/DL (ref 13.3–17.7)
LDLC SERPL CALC-MCNC: 54 MG/DL
LYMPHOCYTES # BLD AUTO: 1.4 10E3/UL (ref 0.8–5.3)
LYMPHOCYTES NFR BLD AUTO: 15 %
MCH RBC QN AUTO: 30.4 PG (ref 26.5–33)
MCHC RBC AUTO-ENTMCNC: 34.3 G/DL (ref 31.5–36.5)
MCV RBC AUTO: 89 FL (ref 78–100)
MONOCYTES # BLD AUTO: 0.7 10E3/UL (ref 0–1.3)
MONOCYTES NFR BLD AUTO: 8 %
NEUTROPHILS # BLD AUTO: 6.6 10E3/UL (ref 1.6–8.3)
NEUTROPHILS NFR BLD AUTO: 72 %
NONHDLC SERPL-MCNC: 69 MG/DL
PLATELET # BLD AUTO: 215 10E3/UL (ref 150–450)
POTASSIUM SERPL-SCNC: 4.3 MMOL/L (ref 3.4–5.3)
PROT SERPL-MCNC: 6.3 G/DL (ref 6.4–8.3)
PSA SERPL-MCNC: 5.78 NG/ML (ref 0–6.5)
RBC # BLD AUTO: 4.77 10E6/UL (ref 4.4–5.9)
SODIUM SERPL-SCNC: 138 MMOL/L (ref 136–145)
TRIGL SERPL-MCNC: 73 MG/DL
TSH SERPL DL<=0.005 MIU/L-ACNC: 3.79 UIU/ML (ref 0.3–4.2)
WBC # BLD AUTO: 9.1 10E3/UL (ref 4–11)

## 2022-11-28 PROCEDURE — G0103 PSA SCREENING: HCPCS

## 2022-11-28 PROCEDURE — 80061 LIPID PANEL: CPT

## 2022-11-28 PROCEDURE — 84443 ASSAY THYROID STIM HORMONE: CPT

## 2022-11-28 PROCEDURE — 80053 COMPREHEN METABOLIC PANEL: CPT

## 2022-11-28 PROCEDURE — 85025 COMPLETE CBC W/AUTO DIFF WBC: CPT

## 2022-11-28 PROCEDURE — 36415 COLL VENOUS BLD VENIPUNCTURE: CPT

## 2023-02-05 ENCOUNTER — HEALTH MAINTENANCE LETTER (OUTPATIENT)
Age: 73
End: 2023-02-05

## 2023-05-23 ENCOUNTER — OFFICE VISIT (OUTPATIENT)
Dept: UROLOGY | Facility: CLINIC | Age: 73
End: 2023-05-23
Payer: MEDICARE

## 2023-05-23 VITALS
SYSTOLIC BLOOD PRESSURE: 137 MMHG | BODY MASS INDEX: 25.77 KG/M2 | DIASTOLIC BLOOD PRESSURE: 88 MMHG | WEIGHT: 180 LBS | HEART RATE: 69 BPM | HEIGHT: 70 IN

## 2023-05-23 DIAGNOSIS — N20.0 NEPHROLITHIASIS: ICD-10-CM

## 2023-05-23 DIAGNOSIS — N40.0 BENIGN PROSTATIC HYPERPLASIA WITHOUT LOWER URINARY TRACT SYMPTOMS: ICD-10-CM

## 2023-05-23 DIAGNOSIS — Z80.42 FAMILY HISTORY OF PROSTATE CANCER IN FATHER: ICD-10-CM

## 2023-05-23 DIAGNOSIS — R31.0 GROSS HEMATURIA: Primary | ICD-10-CM

## 2023-05-23 DIAGNOSIS — Z76.89 ENCOUNTER TO ESTABLISH CARE WITH NEW DOCTOR: ICD-10-CM

## 2023-05-23 PROCEDURE — 99204 OFFICE O/P NEW MOD 45 MIN: CPT | Performed by: STUDENT IN AN ORGANIZED HEALTH CARE EDUCATION/TRAINING PROGRAM

## 2023-05-23 ASSESSMENT — PAIN SCALES - GENERAL: PAINLEVEL: NO PAIN (0)

## 2023-05-23 NOTE — NURSING NOTE
Chief Complaint   Patient presents with     Elevated PSA     Hx of kidney stones     Pt here for elevated PSA, pt from [place]  Pt has seen visible blood in urine several times over this past fall/winter.   Pt has hx of kidney stones, pt states they are very small  Pt had cystoscopy in 2018 for gross hematuria    Isabel Flaherty, CMA

## 2023-05-23 NOTE — PROGRESS NOTES
[facility]  Main Office: [address]  [address]  [place] [address]       CHIEF COMPLAINT:  H/o nephrolithiasis, gross hematuria, elevated PSA, +FH prostate cancer in father    HISTORY:   71 yo M with here to establish care for H/o nephrolithiasis, gross hematuria, elevated PSA w/ h/o negative biopsy , +FH prostate cancer in father    I do not have complete records to review, only partial records from his urologist in [place].    H/o elevated PSA, as high as 8.5 in the past. MRI 2021 PIRADS 2, biopsy 2021 was benign. Most recently PSA was 2023 and was 6.02 ng/ml on outside records. In our system, last was 5.78 ng/ml 2022    H/o nephrolithiasis incidentally found on CT imaging for his aorta, small 2-4 mm, asymptomatic, has never needed treatment    Patient notes gross hematuria, about three episodes of this over the winter, painless; no episodes since January    Occasional cigar smoker    Last cystoscopy was 2018 for microhematuria    Typically in [place] from May-October    Denies significant urinary symptoms. AUA sypmtom score 3-9-4-0-0-0-1 = 2 QOL delighted    PAST MEDICAL HISTORY:   Past Medical History:   Diagnosis Date     Hernia, abdominal        PAST SURGICAL HISTORY:   Past Surgical History:   Procedure Laterality Date     CYSTOSCOPY       HERNIA REPAIR       JOINT REPLACEMENT Bilateral     CRISTOBAL bilat     NASAL SEPTUM SURGERY       ZZC TOTAL KNEE ARTHROPLASTY Bilateral     Description: Total Knee Arthroplasty;  Recorded: 2014;       FAMILY HISTORY:   Family History   Problem Relation Age of Onset     Colitis Mother         ischemic     Prostate Cancer Father      Chronic Obstructive Pulmonary Disease Brother         age 38     Cancer Brother         Hodgkin's       SOCIAL HISTORY:   Social History     Tobacco Use     Smoking status: Former     Types: Cigarettes     Quit date: 1977     Years since quittin.4     Smokeless tobacco: Not on file   Vaping Use      "Vaping status: Not on file   Substance Use Topics     Alcohol use: Not on file          Allergies   Allergen Reactions     Simvastatin Other (See Comments)     myalgia         Current Outpatient Medications:      allopurinol (ZYLOPRIM) 300 MG tablet, [ALLOPURINOL (ZYLOPRIM) 300 MG TABLET] Take 1 tablet (300 mg total) by mouth daily., Disp: 90 tablet, Rfl: 3     aspirin 325 MG tablet, [ASPIRIN 325 MG TABLET] Take 325 mg by mouth daily., Disp: , Rfl:      atorvastatin (LIPITOR) 10 MG tablet, [ATORVASTATIN (LIPITOR) 10 MG TABLET] Take 1 tablet (10 mg total) by mouth daily., Disp: 90 tablet, Rfl: 3     indomethacin (INDOCIN) 25 MG capsule, [INDOMETHACIN (INDOCIN) 25 MG CAPSULE] Take 1 capsule (25 mg total) by mouth 4 (four) times a day as needed., Disp: 30 capsule, Rfl: 5     nitroglycerin (NITROSTAT) 0.4 MG SL tablet, [NITROGLYCERIN (NITROSTAT) 0.4 MG SL TABLET] Place 0.4 mg under the tongue every 5 (five) minutes as needed for chest pain., Disp: , Rfl:      pantoprazole (PROTONIX) 40 MG tablet, [PANTOPRAZOLE (PROTONIX) 40 MG TABLET] Take 1 tablet (40 mg total) by mouth daily., Disp: 180 tablet, Rfl: 3    Review Of Systems:  Skin: No rash, pruritis, or skin pigmentation  Eyes: No changes in vision  Ears/Nose/Throat: No changes in hearing, no nosebleeds  Respiratory: No shortness of breath, dyspnea on exertion, cough, or hemoptysis  Cardiovascular: No chest pain or palpitations  Gastrointestinal: No diarrhea or constipation. No abdominal pain. No hematochezia  Genitourinary: see HPI  Musculoskeletal: No pain or swelling of joints, normal range of motion  Neurologic: No weakness or tremors  Psychiatric: No recent changes in memory or mood  Hematologic/Lymphatic/Immunologic: No easy bruising or enlarged lymph nodes  Endocrine: No weight gain or loss      PHYSICAL EXAM:    /88   Pulse 69   Ht 1.778 m (5' 10\")   Wt 81.6 kg (180 lb)   BMI 25.83 kg/m    General appearance: In NAD, conversant  HEENT: Normocephalic " and atraumatic, anicteric sclera  Cardiovascular: Not examined  Respiratory: normal, non-labored breathing  Gastrointestinal: negative, Abdomen soft, non-tender, and non-distended.   Musculoskeletal: Not Examined  Peripheral Vascular/extremity: No peripheral edema  Skin: Normal temperature, turgor, and texture. No rash  Psychiatric: Appropriate affect, alert and oriented to person, place, and time    Penis: Not Examined  Scrotal Skin: Not Examined   Testicles: Not Examined  Epididymis: Not Examined  Lymphatic: Not examined  Digital Rectal Exam: 60 gram prostate, firm overall, benign feeling without concerning nodule    Cystoscopy: Not done    UA RESULTS:  No results for input(s): COLOR, APPEARANCE, URINEGLC, URINEBILI, URINEKETONE, SG, UBLD, URINEPH, PROTEIN, UROBILINOGEN, NITRITE, LEUKEST, RBCU, WBCU in the last 07712 hours.    Bladder Scan:     Other Labs:      Imaging Studies: None      CLINICAL IMPRESSION:   73 yo M with here to establish care for H/o nephrolithiasis, gross hematuria, elevated PSA w/ h/o negative biopsy 2021, +FH prostate cancer in father, BPH without significant symptoms    Has not had recent workup for the gross hematuria, needs full evaluation with CT urogram and cystoscopy    Re: nephrolithiasis, CT urogram for gross hematuria w/u will assess current burden; he is asymptomatic    Re: elevated PSA and + FH prostate cancer in father, PSA is down from peak of 8.5 when he had mri and biopsy, benign SATNAM, continue to monitor annually    Patient wishes to establish primary care here in [facility] referral    PLAN:   - get CT urogram  - return for cystoscopy    Saran Fairchild MD   [facility]  [phone] clinic phone

## 2023-05-23 NOTE — LETTER
5/23/2023       RE: Filemon Leon  [address]  [address]  [facility] [address]     Dear Colleague,    Thank you for referring your patient, Filemon Leon, to the [facility]OLOG[facility] [place] at [facility]. Please see a copy of my visit note below.    [facility]  Main Office: [address]  [address]  [place] [address]       CHIEF COMPLAINT:  H/o nephrolithiasis, gross hematuria, elevated PSA, +FH prostate cancer in father    HISTORY:   73 yo M with here to establish care for H/o nephrolithiasis, gross hematuria, elevated PSA w/ h/o negative biopsy 2021, +FH prostate cancer in father    I do not have complete records to review, only partial records from his urologist in [place].    H/o elevated PSA, as high as 8.5 in the past. MRI Jan 2021 PIRADS 2, biopsy March 2021 was benign. Most recently PSA was 4/12/2023 and was 6.02 ng/ml on outside records. In our system, last was 5.78 ng/ml 11/28/2022    H/o nephrolithiasis incidentally found on CT imaging for his aorta, small 2-4 mm, asymptomatic, has never needed treatment    Patient notes gross hematuria, about three episodes of this over the winter, painless; no episodes since January    Occasional cigar smoker    Last cystoscopy was 2018 for microhematuria    Typically in [place] from May-October    Denies significant urinary symptoms. AUA sypmtom score 2-8-9-0-0-0-1 = 2 QOL delighted    PAST MEDICAL HISTORY:   Past Medical History:   Diagnosis Date    Hernia, abdominal        PAST SURGICAL HISTORY:   Past Surgical History:   Procedure Laterality Date    CYSTOSCOPY      HERNIA REPAIR      JOINT REPLACEMENT Bilateral     CRISTOBAL bilat    NASAL SEPTUM SURGERY      ZZC TOTAL KNEE ARTHROPLASTY Bilateral     Description: Total Knee Arthroplasty;  Recorded: 06/17/2014;       FAMILY HISTORY:   Family History   Problem Relation Age of Onset    Colitis Mother         ischemic    Prostate Cancer  Father     Chronic Obstructive Pulmonary Disease Brother         age 38    Cancer Brother         Hodgkin's       SOCIAL HISTORY:   Social History     Tobacco Use    Smoking status: Former     Types: Cigarettes     Quit date: 1977     Years since quittin.4    Smokeless tobacco: Not on file   Vaping Use    Vaping status: Not on file   Substance Use Topics    Alcohol use: Not on file          Allergies   Allergen Reactions    Simvastatin Other (See Comments)     myalgia         Current Outpatient Medications:     allopurinol (ZYLOPRIM) 300 MG tablet, [ALLOPURINOL (ZYLOPRIM) 300 MG TABLET] Take 1 tablet (300 mg total) by mouth daily., Disp: 90 tablet, Rfl: 3    aspirin 325 MG tablet, [ASPIRIN 325 MG TABLET] Take 325 mg by mouth daily., Disp: , Rfl:     atorvastatin (LIPITOR) 10 MG tablet, [ATORVASTATIN (LIPITOR) 10 MG TABLET] Take 1 tablet (10 mg total) by mouth daily., Disp: 90 tablet, Rfl: 3    indomethacin (INDOCIN) 25 MG capsule, [INDOMETHACIN (INDOCIN) 25 MG CAPSULE] Take 1 capsule (25 mg total) by mouth 4 (four) times a day as needed., Disp: 30 capsule, Rfl: 5    nitroglycerin (NITROSTAT) 0.4 MG SL tablet, [NITROGLYCERIN (NITROSTAT) 0.4 MG SL TABLET] Place 0.4 mg under the tongue every 5 (five) minutes as needed for chest pain., Disp: , Rfl:     pantoprazole (PROTONIX) 40 MG tablet, [PANTOPRAZOLE (PROTONIX) 40 MG TABLET] Take 1 tablet (40 mg total) by mouth daily., Disp: 180 tablet, Rfl: 3    Review Of Systems:  Skin: No rash, pruritis, or skin pigmentation  Eyes: No changes in vision  Ears/Nose/Throat: No changes in hearing, no nosebleeds  Respiratory: No shortness of breath, dyspnea on exertion, cough, or hemoptysis  Cardiovascular: No chest pain or palpitations  Gastrointestinal: No diarrhea or constipation. No abdominal pain. No hematochezia  Genitourinary: see HPI  Musculoskeletal: No pain or swelling of joints, normal range of motion  Neurologic: No weakness or tremors  Psychiatric: No recent  "changes in memory or mood  Hematologic/Lymphatic/Immunologic: No easy bruising or enlarged lymph nodes  Endocrine: No weight gain or loss      PHYSICAL EXAM:    /88   Pulse 69   Ht 1.778 m (5' 10\")   Wt 81.6 kg (180 lb)   BMI 25.83 kg/m    General appearance: In NAD, conversant  HEENT: Normocephalic and atraumatic, anicteric sclera  Cardiovascular: Not examined  Respiratory: normal, non-labored breathing  Gastrointestinal: negative, Abdomen soft, non-tender, and non-distended.   Musculoskeletal: Not Examined  Peripheral Vascular/extremity: No peripheral edema  Skin: Normal temperature, turgor, and texture. No rash  Psychiatric: Appropriate affect, alert and oriented to person, place, and time    Penis: Not Examined  Scrotal Skin: Not Examined   Testicles: Not Examined  Epididymis: Not Examined  Lymphatic: Not examined  Digital Rectal Exam: 60 gram prostate, firm overall, benign feeling without concerning nodule    Cystoscopy: Not done    UA RESULTS:  No results for input(s): COLOR, APPEARANCE, URINEGLC, URINEBILI, URINEKETONE, SG, UBLD, URINEPH, PROTEIN, UROBILINOGEN, NITRITE, LEUKEST, RBCU, WBCU in the last 66364 hours.    Bladder Scan:     Other Labs:      Imaging Studies: None      CLINICAL IMPRESSION:   73 yo M with here to establish care for H/o nephrolithiasis, gross hematuria, elevated PSA w/ h/o negative biopsy 2021, +FH prostate cancer in father, BPH without significant symptoms    Has not had recent workup for the gross hematuria, needs full evaluation with CT urogram and cystoscopy    Re: nephrolithiasis, CT urogram for gross hematuria w/u will assess current burden; he is asymptomatic    Re: elevated PSA and + FH prostate cancer in father, PSA is down from peak of 8.5 when he had mri and biopsy, benign SATNAM, continue to monitor annually    Patient wishes to establish primary care here in [place], [facility] place referral    PLAN:   - get CT urogram  - return for cystoscopy    Saran Fairchild MD "   [facility]  [phone] clinic phone

## 2023-06-07 ENCOUNTER — HOSPITAL ENCOUNTER (OUTPATIENT)
Dept: CT IMAGING | Facility: CLINIC | Age: 73
Discharge: HOME | End: 2023-06-07
Attending: STUDENT IN AN ORGANIZED HEALTH CARE EDUCATION/TRAINING PROGRAM | Admitting: STUDENT IN AN ORGANIZED HEALTH CARE EDUCATION/TRAINING PROGRAM
Payer: MEDICARE

## 2023-06-07 DIAGNOSIS — R31.0 GROSS HEMATURIA: ICD-10-CM

## 2023-06-07 DIAGNOSIS — N20.0 NEPHROLITHIASIS: ICD-10-CM

## 2023-06-07 LAB
CREAT BLD-MCNC: 1 MG/DL (ref 0.7–1.3)
GFR SERPL CREATININE-BSD FRML MDRD: >60 ML/MIN/1.73M2

## 2023-06-07 PROCEDURE — G1010 CDSM STANSON: HCPCS

## 2023-06-07 PROCEDURE — 82565 ASSAY OF CREATININE: CPT

## 2023-06-07 PROCEDURE — 250N000009 HC RX 250: Performed by: STUDENT IN AN ORGANIZED HEALTH CARE EDUCATION/TRAINING PROGRAM

## 2023-06-07 PROCEDURE — 250N000011 HC RX IP 250 OP 636: Performed by: STUDENT IN AN ORGANIZED HEALTH CARE EDUCATION/TRAINING PROGRAM

## 2023-06-07 RX ORDER — IOPAMIDOL 755 MG/ML
500 INJECTION, SOLUTION INTRAVASCULAR ONCE
Status: COMPLETED | OUTPATIENT
Start: 2023-06-07 | End: 2023-06-07

## 2023-06-07 RX ADMIN — IOPAMIDOL 91 ML: 755 INJECTION, SOLUTION INTRAVENOUS at 09:02

## 2023-06-07 RX ADMIN — SODIUM CHLORIDE 95 ML: 9 INJECTION, SOLUTION INTRAVENOUS at 09:02

## 2023-06-23 ENCOUNTER — OFFICE VISIT (OUTPATIENT)
Dept: INTERNAL MEDICINE | Facility: CLINIC | Age: 73
End: 2023-06-23
Payer: MEDICARE

## 2023-06-23 VITALS
RESPIRATION RATE: 14 BRPM | OXYGEN SATURATION: 98 % | DIASTOLIC BLOOD PRESSURE: 88 MMHG | HEIGHT: 70 IN | HEART RATE: 76 BPM | WEIGHT: 185.9 LBS | TEMPERATURE: 96 F | BODY MASS INDEX: 26.61 KG/M2 | SYSTOLIC BLOOD PRESSURE: 136 MMHG

## 2023-06-23 DIAGNOSIS — E78.5 HYPERLIPIDEMIA, UNSPECIFIED HYPERLIPIDEMIA TYPE: ICD-10-CM

## 2023-06-23 DIAGNOSIS — Z76.89 ENCOUNTER TO ESTABLISH CARE: Primary | ICD-10-CM

## 2023-06-23 DIAGNOSIS — I71.20 THORACIC AORTIC ANEURYSM WITHOUT RUPTURE, UNSPECIFIED PART (H): ICD-10-CM

## 2023-06-23 DIAGNOSIS — M10.9 GOUTY ARTHROPATHY: ICD-10-CM

## 2023-06-23 PROBLEM — E03.8 OTHER SPECIFIED HYPOTHYROIDISM: Status: ACTIVE | Noted: 2023-06-23

## 2023-06-23 PROBLEM — Z95.5 H/O HEART ARTERY STENT: Status: ACTIVE | Noted: 2021-12-20

## 2023-06-23 PROCEDURE — 99204 OFFICE O/P NEW MOD 45 MIN: CPT | Performed by: INTERNAL MEDICINE

## 2023-06-23 RX ORDER — LEVOTHYROXINE SODIUM 50 UG/1
TABLET ORAL
COMMUNITY
Start: 2023-06-23

## 2023-06-23 RX ORDER — ALLOPURINOL 100 MG/1
200 TABLET ORAL DAILY
COMMUNITY
Start: 2023-06-23

## 2023-06-23 ASSESSMENT — ENCOUNTER SYMPTOMS
CARDIOVASCULAR NEGATIVE: 1
CONSTITUTIONAL NEGATIVE: 1
RESPIRATORY NEGATIVE: 1
NEUROLOGICAL NEGATIVE: 1
GASTROINTESTINAL NEGATIVE: 1

## 2023-06-23 ASSESSMENT — PAIN SCALES - GENERAL: PAINLEVEL: NO PAIN (0)

## 2023-06-23 NOTE — PROGRESS NOTES
"  Assessment & Plan     Encounter to establish care  Patient is establishing care today.  He had no acute concerns or issues that he wished to address at this time.    Gout  Patient count does appear to be under good control.  He will continue allopurinol 200 mg by mouth per day for ongoing prophylaxis.  Side effects of medication were reviewed.   Modifications for gout prevention were reviewed.    Hyperlipidemia, unspecified hyperlipidemia type  Patient will continue atorvastatin 10 mg daily for management of his cholesterol at this time.  Side effects of statins were discussed.  After modifications for improved cholesterol control were reviewed.    Thoracic aortic aneurysm without rupture, unspecified part (H)  Chronic condition.  No change in status.        30 minutes spent by me on the date of the encounter doing chart review, history and exam, documentation and further activities per the note       BMI:   Estimated body mass index is 26.67 kg/m  as calculated from the following:    Height as of this encounter: 1.778 m (5' 10\").    Weight as of this encounter: 84.3 kg (185 lb 14.4 oz).       See Patient Instructions    Jose Beck MD  [facility] CenterDANIELA Colbert is a 72 year old, presenting for the following health issues:  Establish Care        6/23/2023     9:14 AM   Additional Questions   Roomed by Raisa GANT   Accompanied by n/a     Patient is a 72-year-old  male who presents to the clinic to establish care with a new physician.  Patient had recently relocated back to [place] from [place] approximately 4 to 5 months ago.  He does wish to establish with a local physician to handle any acute medical issues that may arise.  Patient is currently being evaluated by urology for some issues with kidney stones and hematuria.  Patient does report that his father did have a history of prostate cancer.  Patient also has a history of hyperlipidemia and gout. He is " "currently taking 10 mg of atorvastatin daily along with 200 mg of allopurinol each day as well.  Patient also has a history of hypothyroidism, and he currently takes levothyroxine 50 mcg tablets with instructions to take 2 tablets on Mondays, Wednesdays, and Fridays.  He takes 1 tablet all other days of the week.  Patient reportedly did have lab work in [place] just prior to relocating to [place].  He reports that all of his lab results were unremarkable.  Patient is tolerating all medications without issue.  He has no acute concerns or complaints that he wished to address today.    History of Present Illness       Reason for visit:  Establish relationship with [place] physician    He eats 2-3 servings of fruits and vegetables daily.He consumes 0 sweetened beverage(s) daily.He exercises with enough effort to increase his heart rate 60 or more minutes per day.  He exercises with enough effort to increase his heart rate 5 days per week.   He is taking medications regularly.         Review of Systems   Constitutional: Negative.    HENT: Negative.    Respiratory: Negative.    Cardiovascular: Negative.    Gastrointestinal: Negative.    Neurological: Negative.          Objective    /88 (BP Location: Right arm, Cuff Size: Adult Regular)   Pulse 76   Temp (!) 96  F (35.6  C) (Tympanic)   Resp 14   Ht 1.778 m (5' 10\")   Wt 84.3 kg (185 lb 14.4 oz)   SpO2 98%   BMI 26.67 kg/m    Body mass index is 26.67 kg/m .  Physical Exam  Vitals reviewed.   HENT:      Head: Normocephalic and atraumatic.      Mouth/Throat:      Mouth: Mucous membranes are moist.      Pharynx: Oropharynx is clear.   Eyes:      Extraocular Movements: Extraocular movements intact.      Conjunctiva/sclera: Conjunctivae normal.      Pupils: Pupils are equal, round, and reactive to light.   Cardiovascular:      Rate and Rhythm: Normal rate and regular rhythm.      Pulses: Normal pulses.      Heart sounds: Normal heart sounds.   Pulmonary:      Effort: " Pulmonary effort is normal.      Breath sounds: Normal breath sounds.   Musculoskeletal:         General: Normal range of motion.   Skin:     General: Skin is warm and dry.      Capillary Refill: Capillary refill takes less than 2 seconds.   Neurological:      Mental Status: He is alert.

## 2023-06-29 ENCOUNTER — OFFICE VISIT (OUTPATIENT)
Dept: UROLOGY | Facility: CLINIC | Age: 73
End: 2023-06-29
Payer: MEDICARE

## 2023-06-29 VITALS
BODY MASS INDEX: 26.05 KG/M2 | SYSTOLIC BLOOD PRESSURE: 140 MMHG | WEIGHT: 182 LBS | DIASTOLIC BLOOD PRESSURE: 78 MMHG | HEIGHT: 70 IN

## 2023-06-29 DIAGNOSIS — R31.0 GROSS HEMATURIA: Primary | ICD-10-CM

## 2023-06-29 DIAGNOSIS — N32.9 HYPERVASCULAR LESION OF URINARY BLADDER: ICD-10-CM

## 2023-06-29 DIAGNOSIS — N20.0 RIGHT KIDNEY STONE: ICD-10-CM

## 2023-06-29 DIAGNOSIS — N21.0 BLADDER STONE: ICD-10-CM

## 2023-06-29 LAB
ALBUMIN UR-MCNC: NEGATIVE MG/DL
APPEARANCE UR: CLEAR
BILIRUB UR QL STRIP: NEGATIVE
COLOR UR AUTO: YELLOW
GLUCOSE UR STRIP-MCNC: NEGATIVE MG/DL
HGB UR QL STRIP: NEGATIVE
KETONES UR STRIP-MCNC: NEGATIVE MG/DL
LEUKOCYTE ESTERASE UR QL STRIP: NEGATIVE
NITRATE UR QL: NEGATIVE
PH UR STRIP: 7 [PH] (ref 5–7)
SP GR UR STRIP: 1.01 (ref 1–1.03)
UROBILINOGEN UR STRIP-ACNC: 0.2 E.U./DL

## 2023-06-29 PROCEDURE — 81003 URINALYSIS AUTO W/O SCOPE: CPT | Mod: QW | Performed by: STUDENT IN AN ORGANIZED HEALTH CARE EDUCATION/TRAINING PROGRAM

## 2023-06-29 PROCEDURE — 99214 OFFICE O/P EST MOD 30 MIN: CPT | Mod: 25 | Performed by: STUDENT IN AN ORGANIZED HEALTH CARE EDUCATION/TRAINING PROGRAM

## 2023-06-29 PROCEDURE — 88112 CYTOPATH CELL ENHANCE TECH: CPT | Performed by: PATHOLOGY

## 2023-06-29 PROCEDURE — 52000 CYSTOURETHROSCOPY: CPT | Performed by: STUDENT IN AN ORGANIZED HEALTH CARE EDUCATION/TRAINING PROGRAM

## 2023-06-29 RX ORDER — CEFAZOLIN SODIUM 2 G/50ML
2 SOLUTION INTRAVENOUS SEE ADMIN INSTRUCTIONS
Status: CANCELLED | OUTPATIENT
Start: 2023-06-29

## 2023-06-29 RX ORDER — CEFAZOLIN SODIUM 2 G/50ML
2 SOLUTION INTRAVENOUS
Status: CANCELLED | OUTPATIENT
Start: 2023-06-29

## 2023-06-29 RX ORDER — LIDOCAINE HYDROCHLORIDE 20 MG/ML
JELLY TOPICAL ONCE
Status: COMPLETED | OUTPATIENT
Start: 2023-06-29 | End: 2023-06-29

## 2023-06-29 RX ADMIN — LIDOCAINE HYDROCHLORIDE: 20 JELLY TOPICAL at 10:19

## 2023-06-29 ASSESSMENT — PAIN SCALES - GENERAL: PAINLEVEL: NO PAIN (0)

## 2023-06-29 NOTE — PROGRESS NOTES
"CHIEF COMPLAINT   Filemon Leon who is a 72 year old male returns today for follow-up of H/o nephrolithiasis, gross hematuria, elevated PSA w/ h/o negative biopsy 2021, +FH prostate cancer in father, BPH without significant symptoms.      HPI   Filemon Leon is a 72 year old male returns today for follow-up of H/o nephrolithiasis, gross hematuria, elevated PSA w/ h/o negative biopsy 2021, +FH prostate cancer in father, BPH without significant symptoms.      Has not had further gross hematuria recently    PHYSICAL EXAM  Patient is a 72 year old  male   Vitals: Blood pressure (!) 140/78, height 1.778 m (5' 10\"), weight 82.6 kg (182 lb).  Body mass index is 26.11 kg/m .  General Appearance Adult:   Alert, no acute distress, oriented  HENT: throat/mouth:normal, good dentition  Lungs: no respiratory distress, or pursed lip breathing  Heart: No obvious jugular venous distension present  Abdomen: nondistended  Musculoskeltal: extremities normal, no peripheral edema  Skin: no suspicious lesions or rashes  Neuro: Alert, oriented, speech and mentation normal  Psych: affect and mood normal  Gait: Normal  : circ phallus    All pertinent imaging reviewed:    Ct urogram 6/7/2023    IMPRESSION:   1.  Nonobstructing 0.5 cm stone in the urinary bladder posteriorly on  the left.  2.  There are at least three nonobstructing stones in the right  kidney, with the largest measuring 0.3 cm.  3.  Moderate prostatic enlargement.  4.  Colonic diverticulosis, without evidence for diverticulitis.                 Reviewed and interpreted personally. 5 mm dependent bladder stone. Multiple left lower pole stones up to 7 mm, ~900 HU, STSD 11 cm    PRE-PROCEDURE DIAGNOSIS: gross hematuria    POST-PROCEDURE DIAGNOSIS: gross hematuria, bladder stone, hypervascular bladder lesion    PROCEDURE: Cystoscopy    DESCRIPTION OF PROCEDURE: After informed consent was obtained, the patient was brought to the procedure room where he was placed in the " supine position with all pressure points well padded.  The penis was prepped and draped in sterile fashion. A flexible cystoscope was introduced through a well-lubricated urethra.     Anterior urethra normal  Prostatic urethra about 4 cm with obstructive bilobar hypertrophy and mild intravesical protrusion  Moderate trabeculation of bladder  2 cm area of hypervascular and erythematous bladder at dome, not papillary appearing  5 mm bladder stone free floating    The flexible cystoscope was removed and the findings were described to the patient.       ASSESSMENT and PLAN  72 year old male returns today for follow-up of H/o nephrolithiasis, gross hematuria, elevated PSA w/ h/o negative biopsy 2021, +FH prostate cancer in father, BPH without significant symptoms.      Now with hypervascular bladder lesion (undiagnosed new problem with uncertain prognosis) as well as bladder stone seen on office cystoscopy, also nonobstructive asymptomatic right renal stones which have grown since prior imaging    Re: bladder lesion discussed need for cystoscopy and transurethral resection of bladder tumor for diagnosis and treatment. Risks including bleeding, pain, bladder perforation, infection discussed    Also has a moderate size bladder stone too large to safely remove while awake in the office. Will plan to do laser cystolitholapaxy at time of TURBT    Re: nonobstructive right renal stones, growing and I recommend elective treatment either at same time with other procedures above or at later date. Discussed ESWL vs. URS. Re: URS Risks including need for secondary procedure, ureteral injury, infection, stent pain and irritation, hematuria, incomplete stone clearance discussed. He wishes to proceed with all three procedures at once    - schedule cystoscopy, transurethral resection of bladder tumor, right ureteroscopy with thulium fiber laser lithotripsy, right ureteroscopy with stone basketing, right retrograde pyelogram, right  ureteral stent placement, thulium fiber laser cystolitholapaxy simple <2.5 cm      Saran Fairchild MD   [facility]  [facility] Phone: [phone]

## 2023-06-29 NOTE — LETTER
"6/29/2023       RE: Filemon Leon  [address]  [address]  [place] [facility] [address]     Dear Colleague,    Thank you for referring your patient, Filemon Leon, to the [facility]OLOGY CLINIC [place] [facility]. Please see a copy of my visit note below.    CHIEF COMPLAINT   Filemon Leon who is a 72 year old male returns today for follow-up of H/o nephrolithiasis, gross hematuria, elevated PSA w/ h/o negative biopsy 2021, +FH prostate cancer in father, BPH without significant symptoms.      HPI   Filemon Leon is a 72 year old male returns today for follow-up of H/o nephrolithiasis, gross hematuria, elevated PSA w/ h/o negative biopsy 2021, +FH prostate cancer in father, BPH without significant symptoms.      Has not had further gross hematuria recently    PHYSICAL EXAM  Patient is a 72 year old  male   Vitals: Blood pressure (!) 140/78, height 1.778 m (5' 10\"), weight 82.6 kg (182 lb).  Body mass index is 26.11 kg/m .  General Appearance Adult:   Alert, no acute distress, oriented  HENT: throat/mouth:normal, good dentition  Lungs: no respiratory distress, or pursed lip breathing  Heart: No obvious jugular venous distension present  Abdomen: nondistended  Musculoskeltal: extremities normal, no peripheral edema  Skin: no suspicious lesions or rashes  Neuro: Alert, oriented, speech and mentation normal  Psych: affect and mood normal  Gait: Normal  : circ phallus    All pertinent imaging reviewed:    Ct urogram 6/7/2023    IMPRESSION:   1.  Nonobstructing 0.5 cm stone in the urinary bladder posteriorly on  the left.  2.  There are at least three nonobstructing stones in the right  kidney, with the largest measuring 0.3 cm.  3.  Moderate prostatic enlargement.  4.  Colonic diverticulosis, without evidence for diverticulitis.                 Reviewed and interpreted personally. 5 mm dependent bladder stone. Multiple left lower pole stones up " to 7 mm, ~900 HU, STSD 11 cm    PRE-PROCEDURE DIAGNOSIS: gross hematuria    POST-PROCEDURE DIAGNOSIS: gross hematuria, bladder stone, hypervascular bladder lesion    PROCEDURE: Cystoscopy    DESCRIPTION OF PROCEDURE: After informed consent was obtained, the patient was brought to the procedure room where he was placed in the supine position with all pressure points well padded.  The penis was prepped and draped in sterile fashion. A flexible cystoscope was introduced through a well-lubricated urethra.     Anterior urethra normal  Prostatic urethra about 4 cm with obstructive bilobar hypertrophy and mild intravesical protrusion  Moderate trabeculation of bladder  2 cm area of hypervascular and erythematous bladder at dome, not papillary appearing  5 mm bladder stone free floating    The flexible cystoscope was removed and the findings were described to the patient.       ASSESSMENT and PLAN  72 year old male returns today for follow-up of H/o nephrolithiasis, gross hematuria, elevated PSA w/ h/o negative biopsy 2021, +FH prostate cancer in father, BPH without significant symptoms.      Now with hypervascular bladder lesion (undiagnosed new problem with uncertain prognosis) as well as bladder stone seen on office cystoscopy, also nonobstructive asymptomatic right renal stones which have grown since prior imaging    Re: bladder lesion discussed need for cystoscopy and transurethral resection of bladder tumor for diagnosis and treatment. Risks including bleeding, pain, bladder perforation, infection discussed    Also has a moderate size bladder stone too large to safely remove while awake in the office. Will plan to do laser cystolitholapaxy at time of TURBT    Re: nonobstructive right renal stones, growing and I recommend elective treatment either at same time with other procedures above or at later date. Discussed ESWL vs. URS. Re: URS Risks including need for secondary procedure, ureteral injury, infection, stent  pain and irritation, hematuria, incomplete stone clearance discussed. He wishes to proceed with all three procedures at once    - schedule cystoscopy, transurethral resection of bladder tumor, right ureteroscopy with thulium fiber laser lithotripsy, right ureteroscopy with stone basketing, right retrograde pyelogram, right ureteral stent placement, thulium fiber laser cystolitholapaxy simple <2.5 cm      Saran Fairchild MD   [facility]  [facility] Phone: [phone]

## 2023-06-29 NOTE — NURSING NOTE
Chief Complaint   Patient presents with     Gross Hematuria     cystoscopy     Prior to the start of the procedure and with procedural staff participation, I verbally confirmed the patient s identity using two indicators, relevant allergies, that the procedure was appropriate and matched the consent or emergent situation, and that the correct equipment/implants were available. Immediately prior to starting the procedure I conducted the Time Out with the procedural staff and re-confirmed the patient s name, procedure, and site/side. I have wiped the patient off with the povidone-Iodine solution, draped them, and used Lidocaine hydrochloride jelly. (The Joint Commission universal protocol was followed.)  Yes    Sedation (Moderate or Deep): None    5mL 2% lidocaine hydrochloride Urojet instilled into urethra.    NDC# [address]-2  Lot #: [id number]  Expiration Date:  11/24    Nohemi Howell EMT

## 2023-06-29 NOTE — PATIENT INSTRUCTIONS

## 2023-06-30 LAB
PATH REPORT.COMMENTS IMP SPEC: NO
PATH REPORT.COMMENTS IMP SPEC: NORMAL
PATH REPORT.FINAL DX SPEC: NORMAL
PATH REPORT.GROSS SPEC: NORMAL
PATH REPORT.MICROSCOPIC SPEC OTHER STN: NORMAL
PATH REPORT.RELEVANT HX SPEC: NORMAL

## 2023-07-13 RX ORDER — ASPIRIN 81 MG/1
81 TABLET ORAL DAILY
COMMUNITY

## 2023-07-13 RX ORDER — ZOLPIDEM TARTRATE 5 MG/1
TABLET ORAL
COMMUNITY
Start: 2023-06-21

## 2023-07-13 RX ORDER — CHLORAL HYDRATE 500 MG
1 CAPSULE ORAL DAILY
COMMUNITY

## 2023-07-13 RX ORDER — MULTIVIT WITH MINERALS/LUTEIN
250 TABLET ORAL DAILY
COMMUNITY

## 2023-07-14 ENCOUNTER — OFFICE VISIT (OUTPATIENT)
Dept: INTERNAL MEDICINE | Facility: CLINIC | Age: 73
End: 2023-07-14
Payer: MEDICARE

## 2023-07-14 VITALS
HEIGHT: 70 IN | HEART RATE: 59 BPM | TEMPERATURE: 97.6 F | BODY MASS INDEX: 26.71 KG/M2 | DIASTOLIC BLOOD PRESSURE: 75 MMHG | WEIGHT: 186.6 LBS | RESPIRATION RATE: 16 BRPM | SYSTOLIC BLOOD PRESSURE: 122 MMHG | OXYGEN SATURATION: 98 %

## 2023-07-14 DIAGNOSIS — Z11.59 NEED FOR HEPATITIS C SCREENING TEST: ICD-10-CM

## 2023-07-14 DIAGNOSIS — I10 HYPERTENSION, UNSPECIFIED TYPE: ICD-10-CM

## 2023-07-14 DIAGNOSIS — D49.4 BLADDER TUMOR: ICD-10-CM

## 2023-07-14 DIAGNOSIS — N20.0 NEPHROLITHIASIS: ICD-10-CM

## 2023-07-14 DIAGNOSIS — Z01.818 PREOPERATIVE EXAMINATION: Primary | ICD-10-CM

## 2023-07-14 DIAGNOSIS — E03.8 OTHER SPECIFIED HYPOTHYROIDISM: ICD-10-CM

## 2023-07-14 LAB
ANION GAP SERPL CALCULATED.3IONS-SCNC: 10 MMOL/L (ref 7–15)
BASOPHILS # BLD AUTO: 0 10E3/UL (ref 0–0.2)
BASOPHILS NFR BLD AUTO: 1 %
BUN SERPL-MCNC: 28.2 MG/DL (ref 8–23)
CALCIUM SERPL-MCNC: 9.1 MG/DL (ref 8.8–10.2)
CHLORIDE SERPL-SCNC: 106 MMOL/L (ref 98–107)
CREAT SERPL-MCNC: 0.93 MG/DL (ref 0.67–1.17)
DEPRECATED HCO3 PLAS-SCNC: 26 MMOL/L (ref 22–29)
EOSINOPHIL # BLD AUTO: 0.5 10E3/UL (ref 0–0.7)
EOSINOPHIL NFR BLD AUTO: 8 %
ERYTHROCYTE [DISTWIDTH] IN BLOOD BY AUTOMATED COUNT: 13.9 % (ref 10–15)
GFR SERPL CREATININE-BSD FRML MDRD: 88 ML/MIN/1.73M2
GLUCOSE SERPL-MCNC: 81 MG/DL (ref 70–99)
HCT VFR BLD AUTO: 42.3 % (ref 40–53)
HGB BLD-MCNC: 14.4 G/DL (ref 13.3–17.7)
IMM GRANULOCYTES # BLD: 0 10E3/UL
IMM GRANULOCYTES NFR BLD: 0 %
LYMPHOCYTES # BLD AUTO: 1.4 10E3/UL (ref 0.8–5.3)
LYMPHOCYTES NFR BLD AUTO: 21 %
MCH RBC QN AUTO: 30.3 PG (ref 26.5–33)
MCHC RBC AUTO-ENTMCNC: 34 G/DL (ref 31.5–36.5)
MCV RBC AUTO: 89 FL (ref 78–100)
MONOCYTES # BLD AUTO: 0.6 10E3/UL (ref 0–1.3)
MONOCYTES NFR BLD AUTO: 10 %
NEUTROPHILS # BLD AUTO: 3.9 10E3/UL (ref 1.6–8.3)
NEUTROPHILS NFR BLD AUTO: 61 %
PLATELET # BLD AUTO: 194 10E3/UL (ref 150–450)
POTASSIUM SERPL-SCNC: 4.2 MMOL/L (ref 3.4–5.3)
RBC # BLD AUTO: 4.75 10E6/UL (ref 4.4–5.9)
SODIUM SERPL-SCNC: 142 MMOL/L (ref 136–145)
TSH SERPL DL<=0.005 MIU/L-ACNC: 3.14 UIU/ML (ref 0.3–4.2)
WBC # BLD AUTO: 6.4 10E3/UL (ref 4–11)

## 2023-07-14 PROCEDURE — 36415 COLL VENOUS BLD VENIPUNCTURE: CPT | Performed by: INTERNAL MEDICINE

## 2023-07-14 PROCEDURE — 85025 COMPLETE CBC W/AUTO DIFF WBC: CPT | Performed by: INTERNAL MEDICINE

## 2023-07-14 PROCEDURE — 93000 ELECTROCARDIOGRAM COMPLETE: CPT | Performed by: INTERNAL MEDICINE

## 2023-07-14 PROCEDURE — 99214 OFFICE O/P EST MOD 30 MIN: CPT | Mod: 25 | Performed by: INTERNAL MEDICINE

## 2023-07-14 PROCEDURE — 80048 BASIC METABOLIC PNL TOTAL CA: CPT | Performed by: INTERNAL MEDICINE

## 2023-07-14 PROCEDURE — 84443 ASSAY THYROID STIM HORMONE: CPT | Performed by: INTERNAL MEDICINE

## 2023-07-14 ASSESSMENT — ENCOUNTER SYMPTOMS
NEUROLOGICAL NEGATIVE: 1
RESPIRATORY NEGATIVE: 1
CARDIOVASCULAR NEGATIVE: 1
GASTROINTESTINAL NEGATIVE: 1
MUSCULOSKELETAL NEGATIVE: 1
CONSTITUTIONAL NEGATIVE: 1

## 2023-07-14 NOTE — PROGRESS NOTES
Addendum  CBC, BMP, and TSH showed no concerning findings.  Patient should be able to proceed with his surgery as scheduled.    [facility]  [address]  [address]  [facility] [address]  Phone: [phone]  Primary Provider: Anthony Beck  Pre-op Performing Provider: ANTHONY BECK      PREOPERATIVE EVALUATION:  Today's date: 7/14/2023    Filemon Leon is a 72 year old male who presents for a preoperative evaluation.      7/14/2023     8:44 AM   Additional Questions   Roomed by Michelle RIVAS     Surgical Information:  Surgery/Procedure: Cystoscopy, transurethral resection of bladder tumor; Right ureteroscopy with thulium fiber laser lithotripsy, right ureteroscopy with stone basketing, right retrograde pyelogram, right ureteral stent placement; Thulium fiber laser cystolitholapaxy simple <2.5 cm  Surgery Location: [facility] OR  Surgeon: Dr. Saran Fairchild  Surgery Date: 7/20/23  Time of Surgery: 12:00pm  Where patient plans to recover: At home with family  Fax number for surgical facility: Note does not need to be faxed, will be available electronically in Epic.    Assessment & Plan     The proposed surgical procedure is considered INTERMEDIATE risk.    Preoperative examination, bladder tumor, and nephrolithiasis  At this time, patient does have a relatively unremarkable physical examination.  His blood pressure is noted to be at an acceptable level.  Patient has previously tolerated anesthesia without issue.  He is also able to tolerate greater than 4 METS of physical activity.  EKG is unremarkable.  He does have a CBC, BMP, and TSH that are currently pending.  I would consider him to be an acceptable candidate to proceed with a noncardiac related surgery.  Assuming no unexpected abnormalities on his outstanding lab work, patient should be able to proceed with his surgery as scheduled.  We did discuss the need to hold his aspirin between now and his time of  surgery.  Patient should also follow any additional instructions as provided to him by his performing surgeon.  - Basic metabolic panel  (Ca, Cl, CO2, Creat, Gluc, K, Na, BUN); Future  - CBC with platelets and differential; Future  - EKG 12-lead complete w/read - Clinics    Hypertension, unspecified type  Patient does have a history of hypertension, but he is not currently taking any medication for management of his blood pressure.  His blood pressure today looks excellent at 122/75.  We will continue to monitor for any changes.  Patient was encouraged to check his blood pressure intermittently outside the clinic setting.    Other specified hypothyroidism  Given that he does have a pending surgery, patient did submit blood sample today for a TSH with reflex free T4 to ensure that he is on an appropriate dose of levothyroxine.  While we are awaiting the results of his lab work, he will continue his levothyroxine 50 mcg daily.  Further recommendations will be made once his lab results are available for review.  - TSH with free T4 reflex; Future    Need for hepatitis C screening test  Hepatitis C screening is pending.         - No identified additional risk factors other than previously addressed    Antiplatelet or Anticoagulation Medication Instructions:   - aspirin: Discontinue aspirin 7-10 days prior to procedure to reduce bleeding risk. It should be resumed postoperatively.       RECOMMENDATION:  APPROVAL GIVEN to proceed with proposed procedure pending review of diagnostic evaluation.    Ordering of each unique test  Prescription drug management  30 minutes spent by me on the date of the encounter doing chart review, history and exam, documentation and further activities per the note      Subjective       HPI related to upcoming procedure: Patient is a 72-year-old  male who presents to the clinic for a preoperative examination.  He is currently scheduled for a cystoscopy with bladder tumor resection,  laser lithotripsy, right ureteroscopy with possible ureteral stent placement on July 20, 2023.  Patient has had some difficulties with worsening kidney stones, and his recent cystoscopy in his urologist office did reveal the presence of a new bladder tumor.  Patient has had several procedures in the past including multiple orthopedic procedures.  He has undergone bilateral knee replacements, bilateral hip replacements, shoulder surgery, hernia repair, and cardiac stenting approximately 15 years ago.  Patient has tolerated anesthesia without issue.  He denies any family history of anesthesia intolerance or bleeding disorders.  Patient is able to walk up a flight of stairs without bridging chest pain, shortness of breath, or syncopal episodes.  He does have a history of hypothyroidism, and is currently taking 50 mcg of levothyroxine daily.  His last TSH was collected when he was living in [place], and it is not available for review.  Patient also takes a daily aspirin.        7/7/2023     3:39 PM   Preop Questions   1. Have you ever had a heart attack or stroke? No   2. Have you ever had surgery on your heart or blood vessels, such as a stent placement, a coronary artery bypass, or surgery on an artery in your head, neck, heart, or legs? YES -cardiac stent placement approximately 15 years ago.   3. Do you have chest pain with activity? No   4. Do you have a history of  heart failure? No   5. Do you currently have a cold, bronchitis or symptoms of other infection? No   6. Do you have a cough, shortness of breath, or wheezing? No   7. Do you or anyone in your family have previous history of blood clots? No   8. Do you or does anyone in your family have a serious bleeding problem such as prolonged bleeding following surgeries or cuts? No   9. Have you ever had problems with anemia or been told to take iron pills? No   10. Have you had any abnormal blood loss such as black, tarry or bloody stools? No   11. Have you ever  had a blood transfusion? YES    11a. Have you ever had a transfusion reaction? No   12. Are you willing to have a blood transfusion if it is medically needed before, during, or after your surgery? Yes   13. Have you or any of your relatives ever had problems with anesthesia? No   14. Do you have sleep apnea, excessive snoring or daytime drowsiness? No   15. Do you have any artifical heart valves or other implanted medical devices like a pacemaker, defibrillator, or continuous glucose monitor? No   16. Do you have artificial joints? YES -bilateral knees and hips   17. Are you allergic to latex? No       Health Care Directive:  Patient does not have a Health Care Directive or Living Will: Discussed advance care planning with patient; however, patient declined at this time.    Preoperative Review of :   reviewed - no record of controlled substances prescribed.      Review of Systems   Constitutional: Negative.    HENT: Negative.    Respiratory: Negative.    Cardiovascular: Negative.    Gastrointestinal: Negative.    Musculoskeletal: Negative.    Neurological: Negative.          Patient Active Problem List    Diagnosis Date Noted     Other specified hypothyroidism 06/23/2023     Priority: Medium     H/O heart artery stent 12/20/2021     Priority: Medium     S/P angioplasty with stent 12/28/2016     Priority: Medium     Formatting of this note might be different from the original.  9/2005 LAD; in [place]       Diverticulitis Of Colon      Priority: Medium     Created by Conversion  Replacement Utility updated for latest IMO load         Gout      Priority: Medium     Created by Conversion  [facility] Annotation: Oct 23 2007  9:37AM - Kev Wilson: started   allopurinol 9/07  Replacement Utility updated for latest IMO load         Hypertension      Priority: Medium     Created by Conversion  Replacement Utility updated for latest IMO load      Formatting of this note might be different from the original.  Created  by Conversion    Replacement Utility updated for latest IMO load       Coronary Artery Disease      Priority: Medium     Created by Conversion  [facility] Annotation: Oct 23 2007  9:Kev Aragon: stent   lad   9/05  Replacement Utility updated for latest IMO load         Aneurysm Of The Ascending Aorta      Priority: Medium     Created by Conversion  Replacement Utility updated for latest IMO load         Hyperlipidemia 02/01/2016     Priority: Medium     Created by Conversion      Formatting of this note might be different from the original.  Created by Conversion  Last Assessment & Plan:   Formatting of this note might be different from the original.  Patient's most recent lipid profile last November was favorable. Therefore, I will continue his current therapy.       History of diverticulitis 02/01/2016     Priority: Medium     Chronic Reflux Esophagitis      Priority: Medium     Created by Conversion  [employer] [facility] Annotation: Oct 23 2007  9:37Kev Gay:   Vel[facility]quez's         S/p total knee replacement, bilateral 04/06/2000     Priority: Medium     Formatting of this note might be different from the original.  6/2014 and 8/2014 in [place]    Formatting of this note might be different from the original.  4/2010  Both knees        Past Medical History:   Diagnosis Date     Diverticulosis of large intestine      Hernia, abdominal      High cholesterol      Renal disease     Kidney stones, possible bladder tumor     Stented coronary artery      Thyroid disease     hypothyroid     Past Surgical History:   Procedure Laterality Date     ARTHROPLASTY SHOULDER Right 2022    Reverse total shoulder replacement     ARTHROSCOPY KNEE Left      AS KNEE SCOPE,MED/LAT MENISCUS REPAIR Bilateral     open repair of both meniscus'     AS TOTAL HIP ARTHROPLASTY Bilateral 2014     CYSTOSCOPY       HERNIA REPAIR      Incarcerated left inguinal hernia, and 4 others     JOINT REPLACEMENT Bilateral     CRISTOBAL bilat      "LASIK BILATERAL       NASAL SEPTUM SURGERY       ZZC TOTAL KNEE ARTHROPLASTY Bilateral     Description: Total Knee Arthroplasty;  Recorded: 2014;     Current Outpatient Medications   Medication Sig Dispense Refill     allopurinol (ZYLOPRIM) 100 MG tablet Take 2 tablets (200 mg) by mouth daily       aspirin 81 MG EC tablet Take 81 mg by mouth daily       atorvastatin (LIPITOR) 10 MG tablet [ATORVASTATIN (LIPITOR) 10 MG TABLET] Take 1 tablet (10 mg total) by mouth daily. 90 tablet 3     fish oil-omega-3 fatty acids 1000 MG capsule Take 1 g by mouth daily       indomethacin (INDOCIN) 25 MG capsule [INDOMETHACIN (INDOCIN) 25 MG CAPSULE] Take 1 capsule (25 mg total) by mouth 4 (four) times a day as needed. 30 capsule 5     levothyroxine (SYNTHROID/LEVOTHROID) 50 MCG tablet Take 2 tablets by mouth on , , and .  Take 1 tablet by mouth all other days of the week.       nitroglycerin (NITROSTAT) 0.4 MG SL tablet [NITROGLYCERIN (NITROSTAT) 0.4 MG SL TABLET] Place 0.4 mg under the tongue every 5 (five) minutes as needed for chest pain.       pantoprazole (PROTONIX) 40 MG tablet [PANTOPRAZOLE (PROTONIX) 40 MG TABLET] Take 1 tablet (40 mg total) by mouth daily. 180 tablet 3     vitamin C 250 MG tablet Take 250 mg by mouth daily       zolpidem (AMBIEN) 5 MG tablet 1/2 TABLET 1-2 TIMES PER MONTH AS NEEDED (FOR TRAVEL)         Allergies   Allergen Reactions     Simvastatin Other (See Comments)     myalgia        Social History     Tobacco Use     Smoking status: Former     Types: Cigarettes     Quit date: 1977     Years since quittin.5     Smokeless tobacco: Never   Substance Use Topics     Alcohol use: Never       History   Drug Use Unknown         Objective     /75 (BP Location: Right arm, Patient Position: Sitting, Cuff Size: Adult Regular)   Pulse 59   Temp 97.6  F (36.4  C) (Oral)   Resp 16   Ht 1.778 m (5' 10\")   Wt 84.6 kg (186 lb 9.6 oz)   SpO2 98%   BMI 26.77 kg/m  "     Physical Exam  Vitals reviewed.   HENT:      Head: Normocephalic and atraumatic.      Mouth/Throat:      Mouth: Mucous membranes are moist.      Pharynx: Oropharynx is clear.   Eyes:      Extraocular Movements: Extraocular movements intact.      Conjunctiva/sclera: Conjunctivae normal.      Pupils: Pupils are equal, round, and reactive to light.   Cardiovascular:      Rate and Rhythm: Normal rate and regular rhythm.      Pulses: Normal pulses.      Heart sounds: Normal heart sounds.   Pulmonary:      Effort: Pulmonary effort is normal.      Breath sounds: Normal breath sounds.   Skin:     General: Skin is warm and dry.      Capillary Refill: Capillary refill takes less than 2 seconds.   Neurological:      General: No focal deficit present.      Mental Status: He is alert and oriented to person, place, and time.         Recent Labs   Lab Test 06/07/23  0856 11/28/22  1024   HGB  --  14.5   PLT  --  215   NA  --  138   POTASSIUM  --  4.3   CR 1.0 0.81        Diagnostics:  Labs pending at this time.  Results will be reviewed when available.   EKG: appears normal, NSR, sinus bradycardia, normal axis, normal intervals, no acute ST/T changes c/w ischemia, no LVH by voltage criteria, unchanged from previous tracings    Revised Cardiac Risk Index (RCRI):  The patient has the following serious cardiovascular risks for perioperative complications:   - No serious cardiac risks = 0 points     RCRI Interpretation: 0 points: Class I (very low risk - 0.4% complication rate)           Signed Electronically by: Jose Beck MD  Copy of this evaluation report is provided to requesting physician.

## 2023-07-14 NOTE — H&P (VIEW-ONLY)
Addendum  CBC, BMP, and TSH showed no concerning findings.  Patient should be able to proceed with his surgery as scheduled.    [facility]  [address]  [address]  [facility] [address]  Phone: [phone]  Primary Provider: Anthony Beck  Pre-op Performing Provider: ANTHONY BECK      PREOPERATIVE EVALUATION:  Today's date: 7/14/2023    Filemon Leon is a 72 year old male who presents for a preoperative evaluation.      7/14/2023     8:44 AM   Additional Questions   Roomed by Michelle RIVAS     Surgical Information:  Surgery/Procedure: Cystoscopy, transurethral resection of bladder tumor; Right ureteroscopy with thulium fiber laser lithotripsy, right ureteroscopy with stone basketing, right retrograde pyelogram, right ureteral stent placement; Thulium fiber laser cystolitholapaxy simple <2.5 cm  Surgery Location: [facility] OR  Surgeon: Dr. Saran Fairchild  Surgery Date: 7/20/23  Time of Surgery: 12:00pm  Where patient plans to recover: At home with family  Fax number for surgical facility: Note does not need to be faxed, will be available electronically in Epic.    Assessment & Plan     The proposed surgical procedure is considered INTERMEDIATE risk.    Preoperative examination, bladder tumor, and nephrolithiasis  At this time, patient does have a relatively unremarkable physical examination.  His blood pressure is noted to be at an acceptable level.  Patient has previously tolerated anesthesia without issue.  He is also able to tolerate greater than 4 METS of physical activity.  EKG is unremarkable.  He does have a CBC, BMP, and TSH that are currently pending.  I would consider him to be an acceptable candidate to proceed with a noncardiac related surgery.  Assuming no unexpected abnormalities on his outstanding lab work, patient should be able to proceed with his surgery as scheduled.  We did discuss the need to hold his aspirin between now and his time of  surgery.  Patient should also follow any additional instructions as provided to him by his performing surgeon.  - Basic metabolic panel  (Ca, Cl, CO2, Creat, Gluc, K, Na, BUN); Future  - CBC with platelets and differential; Future  - EKG 12-lead complete w/read - Clinics    Hypertension, unspecified type  Patient does have a history of hypertension, but he is not currently taking any medication for management of his blood pressure.  His blood pressure today looks excellent at 122/75.  We will continue to monitor for any changes.  Patient was encouraged to check his blood pressure intermittently outside the clinic setting.    Other specified hypothyroidism  Given that he does have a pending surgery, patient did submit blood sample today for a TSH with reflex free T4 to ensure that he is on an appropriate dose of levothyroxine.  While we are awaiting the results of his lab work, he will continue his levothyroxine 50 mcg daily.  Further recommendations will be made once his lab results are available for review.  - TSH with free T4 reflex; Future    Need for hepatitis C screening test  Hepatitis C screening is pending.         - No identified additional risk factors other than previously addressed    Antiplatelet or Anticoagulation Medication Instructions:   - aspirin: Discontinue aspirin 7-10 days prior to procedure to reduce bleeding risk. It should be resumed postoperatively.       RECOMMENDATION:  APPROVAL GIVEN to proceed with proposed procedure pending review of diagnostic evaluation.    Ordering of each unique test  Prescription drug management  30 minutes spent by me on the date of the encounter doing chart review, history and exam, documentation and further activities per the note      Subjective       HPI related to upcoming procedure: Patient is a 72-year-old  male who presents to the clinic for a preoperative examination.  He is currently scheduled for a cystoscopy with bladder tumor resection,  laser lithotripsy, right ureteroscopy with possible ureteral stent placement on July 20, 2023.  Patient has had some difficulties with worsening kidney stones, and his recent cystoscopy in his urologist office did reveal the presence of a new bladder tumor.  Patient has had several procedures in the past including multiple orthopedic procedures.  He has undergone bilateral knee replacements, bilateral hip replacements, shoulder surgery, hernia repair, and cardiac stenting approximately 15 years ago.  Patient has tolerated anesthesia without issue.  He denies any family history of anesthesia intolerance or bleeding disorders.  Patient is able to walk up a flight of stairs without bridging chest pain, shortness of breath, or syncopal episodes.  He does have a history of hypothyroidism, and is currently taking 50 mcg of levothyroxine daily.  His last TSH was collected when he was living in [place], and it is not available for review.  Patient also takes a daily aspirin.        7/7/2023     3:39 PM   Preop Questions   1. Have you ever had a heart attack or stroke? No   2. Have you ever had surgery on your heart or blood vessels, such as a stent placement, a coronary artery bypass, or surgery on an artery in your head, neck, heart, or legs? YES -cardiac stent placement approximately 15 years ago.   3. Do you have chest pain with activity? No   4. Do you have a history of  heart failure? No   5. Do you currently have a cold, bronchitis or symptoms of other infection? No   6. Do you have a cough, shortness of breath, or wheezing? No   7. Do you or anyone in your family have previous history of blood clots? No   8. Do you or does anyone in your family have a serious bleeding problem such as prolonged bleeding following surgeries or cuts? No   9. Have you ever had problems with anemia or been told to take iron pills? No   10. Have you had any abnormal blood loss such as black, tarry or bloody stools? No   11. Have you ever  had a blood transfusion? YES    11a. Have you ever had a transfusion reaction? No   12. Are you willing to have a blood transfusion if it is medically needed before, during, or after your surgery? Yes   13. Have you or any of your relatives ever had problems with anesthesia? No   14. Do you have sleep apnea, excessive snoring or daytime drowsiness? No   15. Do you have any artifical heart valves or other implanted medical devices like a pacemaker, defibrillator, or continuous glucose monitor? No   16. Do you have artificial joints? YES -bilateral knees and hips   17. Are you allergic to latex? No       Health Care Directive:  Patient does not have a Health Care Directive or Living Will: Discussed advance care planning with patient; however, patient declined at this time.    Preoperative Review of :   reviewed - no record of controlled substances prescribed.      Review of Systems   Constitutional: Negative.    HENT: Negative.    Respiratory: Negative.    Cardiovascular: Negative.    Gastrointestinal: Negative.    Musculoskeletal: Negative.    Neurological: Negative.          Patient Active Problem List    Diagnosis Date Noted     Other specified hypothyroidism 06/23/2023     Priority: Medium     H/O heart artery stent 12/20/2021     Priority: Medium     S/P angioplasty with stent 12/28/2016     Priority: Medium     Formatting of this note might be different from the original.  9/2005 LAD; in [place]       Diverticulitis Of Colon      Priority: Medium     Created by Conversion  Replacement Utility updated for latest IMO load         Gout      Priority: Medium     Created by Conversion  [facility] Annotation: Oct 23 2007  9:37AM - Kev Wilson: started   allopurinol 9/07  Replacement Utility updated for latest IMO load         Hypertension      Priority: Medium     Created by Conversion  Replacement Utility updated for latest IMO load      Formatting of this note might be different from the original.  Created  by Conversion    Replacement Utility updated for latest IMO load       Coronary Artery Disease      Priority: Medium     Created by Conversion  [facility] Annotation: Oct 23 2007  9:Kev Aragon: stent   lad   9/05  Replacement Utility updated for latest IMO load         Aneurysm Of The Ascending Aorta      Priority: Medium     Created by Conversion  Replacement Utility updated for latest IMO load         Hyperlipidemia 02/01/2016     Priority: Medium     Created by Conversion      Formatting of this note might be different from the original.  Created by Conversion  Last Assessment & Plan:   Formatting of this note might be different from the original.  Patient's most recent lipid profile last November was favorable. Therefore, I will continue his current therapy.       History of diverticulitis 02/01/2016     Priority: Medium     Chronic Reflux Esophagitis      Priority: Medium     Created by Conversion  [employer] [facility] Annotation: Oct 23 2007  9:37Kev Gay:   Velázquez's         S/p total knee replacement, bilateral 04/06/2000     Priority: Medium     Formatting of this note might be different from the original.  6/2014 and 8/2014 in [place]    Formatting of this note might be different from the original.  4/2010  Both knees        Past Medical History:   Diagnosis Date     Diverticulosis of large intestine      Hernia, abdominal      High cholesterol      Renal disease     Kidney stones, possible bladder tumor     Stented coronary artery      Thyroid disease     hypothyroid     Past Surgical History:   Procedure Laterality Date     ARTHROPLASTY SHOULDER Right 2022    Reverse total shoulder replacement     ARTHROSCOPY KNEE Left      AS KNEE SCOPE,MED/LAT MENISCUS REPAIR Bilateral     open repair of both meniscus'     AS TOTAL HIP ARTHROPLASTY Bilateral 2014     CYSTOSCOPY       HERNIA REPAIR      Incarcerated left inguinal hernia, and 4 others     JOINT REPLACEMENT Bilateral     CRISTOBAL bilat      "LASIK BILATERAL       NASAL SEPTUM SURGERY       ZZC TOTAL KNEE ARTHROPLASTY Bilateral     Description: Total Knee Arthroplasty;  Recorded: 2014;     Current Outpatient Medications   Medication Sig Dispense Refill     allopurinol (ZYLOPRIM) 100 MG tablet Take 2 tablets (200 mg) by mouth daily       aspirin 81 MG EC tablet Take 81 mg by mouth daily       atorvastatin (LIPITOR) 10 MG tablet [ATORVASTATIN (LIPITOR) 10 MG TABLET] Take 1 tablet (10 mg total) by mouth daily. 90 tablet 3     fish oil-omega-3 fatty acids 1000 MG capsule Take 1 g by mouth daily       indomethacin (INDOCIN) 25 MG capsule [INDOMETHACIN (INDOCIN) 25 MG CAPSULE] Take 1 capsule (25 mg total) by mouth 4 (four) times a day as needed. 30 capsule 5     levothyroxine (SYNTHROID/LEVOTHROID) 50 MCG tablet Take 2 tablets by mouth on , , and .  Take 1 tablet by mouth all other days of the week.       nitroglycerin (NITROSTAT) 0.4 MG SL tablet [NITROGLYCERIN (NITROSTAT) 0.4 MG SL TABLET] Place 0.4 mg under the tongue every 5 (five) minutes as needed for chest pain.       pantoprazole (PROTONIX) 40 MG tablet [PANTOPRAZOLE (PROTONIX) 40 MG TABLET] Take 1 tablet (40 mg total) by mouth daily. 180 tablet 3     vitamin C 250 MG tablet Take 250 mg by mouth daily       zolpidem (AMBIEN) 5 MG tablet 1/2 TABLET 1-2 TIMES PER MONTH AS NEEDED (FOR TRAVEL)         Allergies   Allergen Reactions     Simvastatin Other (See Comments)     myalgia        Social History     Tobacco Use     Smoking status: Former     Types: Cigarettes     Quit date: 1977     Years since quittin.5     Smokeless tobacco: Never   Substance Use Topics     Alcohol use: Never       History   Drug Use Unknown         Objective     /75 (BP Location: Right arm, Patient Position: Sitting, Cuff Size: Adult Regular)   Pulse 59   Temp 97.6  F (36.4  C) (Oral)   Resp 16   Ht 1.778 m (5' 10\")   Wt 84.6 kg (186 lb 9.6 oz)   SpO2 98%   BMI 26.77 kg/m  "     Physical Exam  Vitals reviewed.   HENT:      Head: Normocephalic and atraumatic.      Mouth/Throat:      Mouth: Mucous membranes are moist.      Pharynx: Oropharynx is clear.   Eyes:      Extraocular Movements: Extraocular movements intact.      Conjunctiva/sclera: Conjunctivae normal.      Pupils: Pupils are equal, round, and reactive to light.   Cardiovascular:      Rate and Rhythm: Normal rate and regular rhythm.      Pulses: Normal pulses.      Heart sounds: Normal heart sounds.   Pulmonary:      Effort: Pulmonary effort is normal.      Breath sounds: Normal breath sounds.   Skin:     General: Skin is warm and dry.      Capillary Refill: Capillary refill takes less than 2 seconds.   Neurological:      General: No focal deficit present.      Mental Status: He is alert and oriented to person, place, and time.         Recent Labs   Lab Test 06/07/23  0856 11/28/22  1024   HGB  --  14.5   PLT  --  215   NA  --  138   POTASSIUM  --  4.3   CR 1.0 0.81        Diagnostics:  Labs pending at this time.  Results will be reviewed when available.   EKG: appears normal, NSR, sinus bradycardia, normal axis, normal intervals, no acute ST/T changes c/w ischemia, no LVH by voltage criteria, unchanged from previous tracings    Revised Cardiac Risk Index (RCRI):  The patient has the following serious cardiovascular risks for perioperative complications:   - No serious cardiac risks = 0 points     RCRI Interpretation: 0 points: Class I (very low risk - 0.4% complication rate)           Signed Electronically by: Jose Beck MD  Copy of this evaluation report is provided to requesting physician.

## 2023-07-20 ENCOUNTER — ANESTHESIA (OUTPATIENT)
Dept: SURGERY | Facility: CLINIC | Age: 73
End: 2023-07-20
Payer: MEDICARE

## 2023-07-20 ENCOUNTER — ANESTHESIA EVENT (OUTPATIENT)
Dept: SURGERY | Facility: CLINIC | Age: 73
End: 2023-07-20
Payer: MEDICARE

## 2023-07-20 ENCOUNTER — APPOINTMENT (OUTPATIENT)
Dept: GENERAL RADIOLOGY | Facility: CLINIC | Age: 73
End: 2023-07-20
Attending: STUDENT IN AN ORGANIZED HEALTH CARE EDUCATION/TRAINING PROGRAM
Payer: MEDICARE

## 2023-07-20 ENCOUNTER — HOSPITAL ENCOUNTER (OUTPATIENT)
Facility: CLINIC | Age: 73
Discharge: HOME | End: 2023-07-21
Attending: STUDENT IN AN ORGANIZED HEALTH CARE EDUCATION/TRAINING PROGRAM | Admitting: STUDENT IN AN ORGANIZED HEALTH CARE EDUCATION/TRAINING PROGRAM
Payer: MEDICARE

## 2023-07-20 DIAGNOSIS — N32.9 HYPERVASCULAR LESION OF URINARY BLADDER: ICD-10-CM

## 2023-07-20 DIAGNOSIS — N20.0 RIGHT KIDNEY STONE: Primary | ICD-10-CM

## 2023-07-20 PROBLEM — R31.0 GROSS HEMATURIA: Status: ACTIVE | Noted: 2023-07-20

## 2023-07-20 LAB
ABO/RH(D): NORMAL
ANTIBODY SCREEN: NEGATIVE
SPECIMEN EXPIRATION DATE: NORMAL

## 2023-07-20 PROCEDURE — 272N000001 HC OR GENERAL SUPPLY STERILE: Performed by: STUDENT IN AN ORGANIZED HEALTH CARE EDUCATION/TRAINING PROGRAM

## 2023-07-20 PROCEDURE — 250N000011 HC RX IP 250 OP 636: Mod: JZ | Performed by: NURSE ANESTHETIST, CERTIFIED REGISTERED

## 2023-07-20 PROCEDURE — 88360 TUMOR IMMUNOHISTOCHEM/MANUAL: CPT | Mod: 26 | Performed by: PATHOLOGY

## 2023-07-20 PROCEDURE — 272N000002 HC OR SUPPLY OTHER OPNP: Performed by: STUDENT IN AN ORGANIZED HEALTH CARE EDUCATION/TRAINING PROGRAM

## 2023-07-20 PROCEDURE — 52356 CYSTO/URETERO W/LITHOTRIPSY: CPT | Mod: RT | Performed by: STUDENT IN AN ORGANIZED HEALTH CARE EDUCATION/TRAINING PROGRAM

## 2023-07-20 PROCEDURE — C1894 INTRO/SHEATH, NON-LASER: HCPCS | Performed by: STUDENT IN AN ORGANIZED HEALTH CARE EDUCATION/TRAINING PROGRAM

## 2023-07-20 PROCEDURE — 258N000003 HC RX IP 258 OP 636: Performed by: ANESTHESIOLOGY

## 2023-07-20 PROCEDURE — 250N000009 HC RX 250: Performed by: STUDENT IN AN ORGANIZED HEALTH CARE EDUCATION/TRAINING PROGRAM

## 2023-07-20 PROCEDURE — 88341 IMHCHEM/IMCYTCHM EA ADD ANTB: CPT | Mod: 26 | Performed by: PATHOLOGY

## 2023-07-20 PROCEDURE — 370N000017 HC ANESTHESIA TECHNICAL FEE, PER MIN: Performed by: STUDENT IN AN ORGANIZED HEALTH CARE EDUCATION/TRAINING PROGRAM

## 2023-07-20 PROCEDURE — 74420 UROGRAPHY RTRGR +-KUB: CPT | Mod: 26 | Performed by: STUDENT IN AN ORGANIZED HEALTH CARE EDUCATION/TRAINING PROGRAM

## 2023-07-20 PROCEDURE — 86901 BLOOD TYPING SEROLOGIC RH(D): CPT | Performed by: STUDENT IN AN ORGANIZED HEALTH CARE EDUCATION/TRAINING PROGRAM

## 2023-07-20 PROCEDURE — 999N000141 HC STATISTIC PRE-PROCEDURE NURSING ASSESSMENT: Performed by: STUDENT IN AN ORGANIZED HEALTH CARE EDUCATION/TRAINING PROGRAM

## 2023-07-20 PROCEDURE — 88365 INSITU HYBRIDIZATION (FISH): CPT | Mod: 26 | Performed by: PATHOLOGY

## 2023-07-20 PROCEDURE — 250N000011 HC RX IP 250 OP 636: Performed by: STUDENT IN AN ORGANIZED HEALTH CARE EDUCATION/TRAINING PROGRAM

## 2023-07-20 PROCEDURE — 710N000009 HC RECOVERY PHASE 1, LEVEL 1, PER MIN: Performed by: STUDENT IN AN ORGANIZED HEALTH CARE EDUCATION/TRAINING PROGRAM

## 2023-07-20 PROCEDURE — C1769 GUIDE WIRE: HCPCS | Performed by: STUDENT IN AN ORGANIZED HEALTH CARE EDUCATION/TRAINING PROGRAM

## 2023-07-20 PROCEDURE — 999N000179 XR SURGERY CARM FLUORO LESS THAN 5 MIN W STILLS: Mod: TC

## 2023-07-20 PROCEDURE — 258N000001 HC RX 258: Performed by: STUDENT IN AN ORGANIZED HEALTH CARE EDUCATION/TRAINING PROGRAM

## 2023-07-20 PROCEDURE — C2617 STENT, NON-COR, TEM W/O DEL: HCPCS | Performed by: STUDENT IN AN ORGANIZED HEALTH CARE EDUCATION/TRAINING PROGRAM

## 2023-07-20 PROCEDURE — 88307 TISSUE EXAM BY PATHOLOGIST: CPT | Mod: TC | Performed by: STUDENT IN AN ORGANIZED HEALTH CARE EDUCATION/TRAINING PROGRAM

## 2023-07-20 PROCEDURE — 360N000084 HC SURGERY LEVEL 4 W/ FLUORO, PER MIN: Performed by: STUDENT IN AN ORGANIZED HEALTH CARE EDUCATION/TRAINING PROGRAM

## 2023-07-20 PROCEDURE — 86850 RBC ANTIBODY SCREEN: CPT | Performed by: STUDENT IN AN ORGANIZED HEALTH CARE EDUCATION/TRAINING PROGRAM

## 2023-07-20 PROCEDURE — 88342 IMHCHEM/IMCYTCHM 1ST ANTB: CPT | Mod: 26 | Performed by: PATHOLOGY

## 2023-07-20 PROCEDURE — 88364 INSITU HYBRIDIZATION (FISH): CPT | Mod: TC | Performed by: STUDENT IN AN ORGANIZED HEALTH CARE EDUCATION/TRAINING PROGRAM

## 2023-07-20 PROCEDURE — 88307 TISSUE EXAM BY PATHOLOGIST: CPT | Mod: 26 | Performed by: PATHOLOGY

## 2023-07-20 PROCEDURE — 82365 CALCULUS SPECTROSCOPY: CPT | Performed by: STUDENT IN AN ORGANIZED HEALTH CARE EDUCATION/TRAINING PROGRAM

## 2023-07-20 PROCEDURE — 250N000009 HC RX 250

## 2023-07-20 PROCEDURE — C1758 CATHETER, URETERAL: HCPCS | Performed by: STUDENT IN AN ORGANIZED HEALTH CARE EDUCATION/TRAINING PROGRAM

## 2023-07-20 PROCEDURE — 250N000011 HC RX IP 250 OP 636

## 2023-07-20 PROCEDURE — 250N000013 HC RX MED GY IP 250 OP 250 PS 637: Performed by: STUDENT IN AN ORGANIZED HEALTH CARE EDUCATION/TRAINING PROGRAM

## 2023-07-20 PROCEDURE — 36415 COLL VENOUS BLD VENIPUNCTURE: CPT | Performed by: STUDENT IN AN ORGANIZED HEALTH CARE EDUCATION/TRAINING PROGRAM

## 2023-07-20 PROCEDURE — 52235 CYSTOSCOPY AND TREATMENT: CPT | Mod: 59 | Performed by: STUDENT IN AN ORGANIZED HEALTH CARE EDUCATION/TRAINING PROGRAM

## 2023-07-20 PROCEDURE — 255N000002 HC RX 255 OP 636: Mod: JZ | Performed by: STUDENT IN AN ORGANIZED HEALTH CARE EDUCATION/TRAINING PROGRAM

## 2023-07-20 PROCEDURE — 250N000025 HC SEVOFLURANE, PER MIN: Performed by: STUDENT IN AN ORGANIZED HEALTH CARE EDUCATION/TRAINING PROGRAM

## 2023-07-20 DEVICE — URETERAL STENT
Type: IMPLANTABLE DEVICE | Site: URETER | Status: FUNCTIONAL
Brand: POLARIS™ ULTRA

## 2023-07-20 RX ORDER — GLYCOPYRROLATE 0.2 MG/ML
INJECTION, SOLUTION INTRAMUSCULAR; INTRAVENOUS PRN
Status: DISCONTINUED | OUTPATIENT
Start: 2023-07-20 | End: 2023-07-20

## 2023-07-20 RX ORDER — NALOXONE HYDROCHLORIDE 0.4 MG/ML
0.4 INJECTION, SOLUTION INTRAMUSCULAR; INTRAVENOUS; SUBCUTANEOUS
Status: DISCONTINUED | OUTPATIENT
Start: 2023-07-20 | End: 2023-07-21 | Stop reason: HOSPADM

## 2023-07-20 RX ORDER — PROPOFOL 10 MG/ML
INJECTION, EMULSION INTRAVENOUS PRN
Status: DISCONTINUED | OUTPATIENT
Start: 2023-07-20 | End: 2023-07-20

## 2023-07-20 RX ORDER — ATORVASTATIN CALCIUM 10 MG/1
10 TABLET, FILM COATED ORAL DAILY
Status: DISCONTINUED | OUTPATIENT
Start: 2023-07-20 | End: 2023-07-21 | Stop reason: HOSPADM

## 2023-07-20 RX ORDER — OXYCODONE HYDROCHLORIDE 5 MG/1
5 TABLET ORAL EVERY 4 HOURS PRN
Status: DISCONTINUED | OUTPATIENT
Start: 2023-07-20 | End: 2023-07-21 | Stop reason: HOSPADM

## 2023-07-20 RX ORDER — DEXAMETHASONE SODIUM PHOSPHATE 4 MG/ML
INJECTION, SOLUTION INTRA-ARTICULAR; INTRALESIONAL; INTRAMUSCULAR; INTRAVENOUS; SOFT TISSUE PRN
Status: DISCONTINUED | OUTPATIENT
Start: 2023-07-20 | End: 2023-07-20

## 2023-07-20 RX ORDER — TAMSULOSIN HYDROCHLORIDE 0.4 MG/1
0.4 CAPSULE ORAL DAILY
Qty: 14 CAPSULE | Refills: 0 | Status: SHIPPED | OUTPATIENT
Start: 2023-07-20

## 2023-07-20 RX ORDER — TAMSULOSIN HYDROCHLORIDE 0.4 MG/1
0.4 CAPSULE ORAL DAILY
Status: DISCONTINUED | OUTPATIENT
Start: 2023-07-20 | End: 2023-07-21 | Stop reason: HOSPADM

## 2023-07-20 RX ORDER — OXYCODONE HYDROCHLORIDE 5 MG/1
5 TABLET ORAL
Status: DISCONTINUED | OUTPATIENT
Start: 2023-07-20 | End: 2023-07-20 | Stop reason: HOSPADM

## 2023-07-20 RX ORDER — ONDANSETRON 4 MG/1
4 TABLET, ORALLY DISINTEGRATING ORAL EVERY 6 HOURS PRN
Status: DISCONTINUED | OUTPATIENT
Start: 2023-07-20 | End: 2023-07-21 | Stop reason: HOSPADM

## 2023-07-20 RX ORDER — LEVOTHYROXINE SODIUM 100 UG/1
100 TABLET ORAL
Status: DISCONTINUED | OUTPATIENT
Start: 2023-07-21 | End: 2023-07-21 | Stop reason: HOSPADM

## 2023-07-20 RX ORDER — ONDANSETRON 2 MG/ML
4 INJECTION INTRAMUSCULAR; INTRAVENOUS EVERY 30 MIN PRN
Status: DISCONTINUED | OUTPATIENT
Start: 2023-07-20 | End: 2023-07-20 | Stop reason: HOSPADM

## 2023-07-20 RX ORDER — ACETAMINOPHEN 325 MG/1
650 TABLET ORAL EVERY 6 HOURS
Status: DISCONTINUED | OUTPATIENT
Start: 2023-07-20 | End: 2023-07-21 | Stop reason: HOSPADM

## 2023-07-20 RX ORDER — ONDANSETRON 4 MG/1
4 TABLET, ORALLY DISINTEGRATING ORAL EVERY 30 MIN PRN
Status: DISCONTINUED | OUTPATIENT
Start: 2023-07-20 | End: 2023-07-20 | Stop reason: HOSPADM

## 2023-07-20 RX ORDER — HYDROMORPHONE HCL IN WATER/PF 6 MG/30 ML
0.2 PATIENT CONTROLLED ANALGESIA SYRINGE INTRAVENOUS EVERY 5 MIN PRN
Status: DISCONTINUED | OUTPATIENT
Start: 2023-07-20 | End: 2023-07-20 | Stop reason: HOSPADM

## 2023-07-20 RX ORDER — OXYCODONE HYDROCHLORIDE 5 MG/1
10 TABLET ORAL EVERY 4 HOURS PRN
Status: DISCONTINUED | OUTPATIENT
Start: 2023-07-20 | End: 2023-07-21 | Stop reason: HOSPADM

## 2023-07-20 RX ORDER — LIDOCAINE HYDROCHLORIDE 20 MG/ML
INJECTION, SOLUTION INFILTRATION; PERINEURAL PRN
Status: DISCONTINUED | OUTPATIENT
Start: 2023-07-20 | End: 2023-07-20

## 2023-07-20 RX ORDER — CIPROFLOXACIN 500 MG/1
500 TABLET, FILM COATED ORAL ONCE
Qty: 1 TABLET | Refills: 0 | Status: SHIPPED | OUTPATIENT
Start: 2023-07-20 | End: 2023-07-20

## 2023-07-20 RX ORDER — ACETAMINOPHEN 500 MG
1000 TABLET ORAL EVERY 6 HOURS PRN
Qty: 100 TABLET | Refills: 0 | Status: SHIPPED | OUTPATIENT
Start: 2023-07-20

## 2023-07-20 RX ORDER — FENTANYL CITRATE 50 UG/ML
INJECTION, SOLUTION INTRAMUSCULAR; INTRAVENOUS PRN
Status: DISCONTINUED | OUTPATIENT
Start: 2023-07-20 | End: 2023-07-20

## 2023-07-20 RX ORDER — FENTANYL CITRATE 50 UG/ML
25 INJECTION, SOLUTION INTRAMUSCULAR; INTRAVENOUS EVERY 5 MIN PRN
Status: DISCONTINUED | OUTPATIENT
Start: 2023-07-20 | End: 2023-07-20 | Stop reason: HOSPADM

## 2023-07-20 RX ORDER — OXYCODONE HYDROCHLORIDE 5 MG/1
5 TABLET ORAL EVERY 6 HOURS PRN
Qty: 6 TABLET | Refills: 0 | Status: SHIPPED | OUTPATIENT
Start: 2023-07-20 | End: 2023-07-23

## 2023-07-20 RX ORDER — ONDANSETRON 2 MG/ML
INJECTION INTRAMUSCULAR; INTRAVENOUS PRN
Status: DISCONTINUED | OUTPATIENT
Start: 2023-07-20 | End: 2023-07-20

## 2023-07-20 RX ORDER — CEFAZOLIN SODIUM/WATER 2 G/20 ML
2 SYRINGE (ML) INTRAVENOUS SEE ADMIN INSTRUCTIONS
Status: DISCONTINUED | OUTPATIENT
Start: 2023-07-20 | End: 2023-07-20 | Stop reason: HOSPADM

## 2023-07-20 RX ORDER — LIDOCAINE 40 MG/G
CREAM TOPICAL
Status: DISCONTINUED | OUTPATIENT
Start: 2023-07-20 | End: 2023-07-20 | Stop reason: HOSPADM

## 2023-07-20 RX ORDER — HYDROMORPHONE HCL IN WATER/PF 6 MG/30 ML
0.4 PATIENT CONTROLLED ANALGESIA SYRINGE INTRAVENOUS EVERY 5 MIN PRN
Status: DISCONTINUED | OUTPATIENT
Start: 2023-07-20 | End: 2023-07-20 | Stop reason: HOSPADM

## 2023-07-20 RX ORDER — MULTIVITAMIN,THERAPEUTIC
1 TABLET ORAL DAILY
COMMUNITY

## 2023-07-20 RX ORDER — NALOXONE HYDROCHLORIDE 0.4 MG/ML
0.2 INJECTION, SOLUTION INTRAMUSCULAR; INTRAVENOUS; SUBCUTANEOUS
Status: DISCONTINUED | OUTPATIENT
Start: 2023-07-20 | End: 2023-07-21 | Stop reason: HOSPADM

## 2023-07-20 RX ORDER — ONDANSETRON 2 MG/ML
4 INJECTION INTRAMUSCULAR; INTRAVENOUS EVERY 6 HOURS PRN
Status: DISCONTINUED | OUTPATIENT
Start: 2023-07-20 | End: 2023-07-21 | Stop reason: HOSPADM

## 2023-07-20 RX ORDER — CEFAZOLIN SODIUM/WATER 2 G/20 ML
2 SYRINGE (ML) INTRAVENOUS
Status: COMPLETED | OUTPATIENT
Start: 2023-07-20 | End: 2023-07-20

## 2023-07-20 RX ORDER — FENTANYL CITRATE 50 UG/ML
50 INJECTION, SOLUTION INTRAMUSCULAR; INTRAVENOUS EVERY 5 MIN PRN
Status: DISCONTINUED | OUTPATIENT
Start: 2023-07-20 | End: 2023-07-20 | Stop reason: HOSPADM

## 2023-07-20 RX ORDER — PANTOPRAZOLE SODIUM 40 MG/1
40 TABLET, DELAYED RELEASE ORAL DAILY
Status: DISCONTINUED | OUTPATIENT
Start: 2023-07-21 | End: 2023-07-21 | Stop reason: HOSPADM

## 2023-07-20 RX ORDER — OXYBUTYNIN CHLORIDE 5 MG/1
5 TABLET ORAL 3 TIMES DAILY PRN
Qty: 15 TABLET | Refills: 0 | Status: SHIPPED | OUTPATIENT
Start: 2023-07-20

## 2023-07-20 RX ORDER — EPHEDRINE SULFATE 50 MG/ML
INJECTION, SOLUTION INTRAMUSCULAR; INTRAVENOUS; SUBCUTANEOUS PRN
Status: DISCONTINUED | OUTPATIENT
Start: 2023-07-20 | End: 2023-07-20

## 2023-07-20 RX ORDER — SODIUM CHLORIDE, SODIUM LACTATE, POTASSIUM CHLORIDE, CALCIUM CHLORIDE 600; 310; 30; 20 MG/100ML; MG/100ML; MG/100ML; MG/100ML
INJECTION, SOLUTION INTRAVENOUS CONTINUOUS
Status: DISCONTINUED | OUTPATIENT
Start: 2023-07-20 | End: 2023-07-20 | Stop reason: HOSPADM

## 2023-07-20 RX ORDER — OXYCODONE HYDROCHLORIDE 5 MG/1
10 TABLET ORAL
Status: DISCONTINUED | OUTPATIENT
Start: 2023-07-20 | End: 2023-07-20 | Stop reason: HOSPADM

## 2023-07-20 RX ORDER — ALLOPURINOL 100 MG/1
200 TABLET ORAL DAILY
Status: DISCONTINUED | OUTPATIENT
Start: 2023-07-20 | End: 2023-07-21 | Stop reason: HOSPADM

## 2023-07-20 RX ORDER — DOCUSATE SODIUM 100 MG/1
100 CAPSULE, LIQUID FILLED ORAL 2 TIMES DAILY
Qty: 30 CAPSULE | Refills: 0 | Status: SHIPPED | OUTPATIENT
Start: 2023-07-20

## 2023-07-20 RX ORDER — FUROSEMIDE 10 MG/ML
INJECTION INTRAMUSCULAR; INTRAVENOUS PRN
Status: DISCONTINUED | OUTPATIENT
Start: 2023-07-20 | End: 2023-07-20

## 2023-07-20 RX ORDER — NEOSTIGMINE METHYLSULFATE 1 MG/ML
VIAL (ML) INJECTION PRN
Status: DISCONTINUED | OUTPATIENT
Start: 2023-07-20 | End: 2023-07-20

## 2023-07-20 RX ORDER — LEVOTHYROXINE SODIUM 50 UG/1
50 TABLET ORAL
Status: DISCONTINUED | OUTPATIENT
Start: 2023-07-20 | End: 2023-07-21 | Stop reason: HOSPADM

## 2023-07-20 RX ORDER — LIDOCAINE 40 MG/G
CREAM TOPICAL
Status: DISCONTINUED | OUTPATIENT
Start: 2023-07-20 | End: 2023-07-21 | Stop reason: HOSPADM

## 2023-07-20 RX ADMIN — ATORVASTATIN CALCIUM 10 MG: 10 TABLET, FILM COATED ORAL at 21:40

## 2023-07-20 RX ADMIN — FENTANYL CITRATE 50 MCG: 50 INJECTION, SOLUTION INTRAMUSCULAR; INTRAVENOUS at 13:46

## 2023-07-20 RX ADMIN — ONDANSETRON 4 MG: 2 INJECTION INTRAMUSCULAR; INTRAVENOUS at 13:31

## 2023-07-20 RX ADMIN — FENTANYL CITRATE 100 MCG: 50 INJECTION, SOLUTION INTRAMUSCULAR; INTRAVENOUS at 12:17

## 2023-07-20 RX ADMIN — Medication 10 MG: at 12:30

## 2023-07-20 RX ADMIN — ALLOPURINOL 200 MG: 100 TABLET ORAL at 21:40

## 2023-07-20 RX ADMIN — OXYCODONE HYDROCHLORIDE 5 MG: 5 TABLET ORAL at 22:22

## 2023-07-20 RX ADMIN — Medication 10 MG: at 12:42

## 2023-07-20 RX ADMIN — GLYCOPYRROLATE 0.2 MG: 0.2 INJECTION, SOLUTION INTRAMUSCULAR; INTRAVENOUS at 12:28

## 2023-07-20 RX ADMIN — ROCURONIUM BROMIDE 35 MG: 50 INJECTION, SOLUTION INTRAVENOUS at 12:18

## 2023-07-20 RX ADMIN — Medication 2 G: at 12:13

## 2023-07-20 RX ADMIN — NEOSTIGMINE METHYLSULFATE 3 MG: 1 INJECTION, SOLUTION INTRAVENOUS at 13:41

## 2023-07-20 RX ADMIN — TAMSULOSIN HYDROCHLORIDE 0.4 MG: 0.4 CAPSULE ORAL at 17:39

## 2023-07-20 RX ADMIN — LIDOCAINE HYDROCHLORIDE 50 MG: 20 INJECTION, SOLUTION INFILTRATION; PERINEURAL at 12:17

## 2023-07-20 RX ADMIN — ACETAMINOPHEN 650 MG: 325 TABLET, FILM COATED ORAL at 17:39

## 2023-07-20 RX ADMIN — PROPOFOL 200 MG: 10 INJECTION, EMULSION INTRAVENOUS at 12:17

## 2023-07-20 RX ADMIN — Medication 5 MG: at 13:36

## 2023-07-20 RX ADMIN — FUROSEMIDE 20 MG: 10 INJECTION, SOLUTION INTRAVENOUS at 13:48

## 2023-07-20 RX ADMIN — SODIUM CHLORIDE, POTASSIUM CHLORIDE, SODIUM LACTATE AND CALCIUM CHLORIDE: 600; 310; 30; 20 INJECTION, SOLUTION INTRAVENOUS at 11:10

## 2023-07-20 RX ADMIN — FENTANYL CITRATE 50 MCG: 50 INJECTION, SOLUTION INTRAMUSCULAR; INTRAVENOUS at 13:30

## 2023-07-20 RX ADMIN — SODIUM CHLORIDE, POTASSIUM CHLORIDE, SODIUM LACTATE AND CALCIUM CHLORIDE: 600; 310; 30; 20 INJECTION, SOLUTION INTRAVENOUS at 13:05

## 2023-07-20 RX ADMIN — LEVOTHYROXINE SODIUM 50 MCG: 50 TABLET ORAL at 21:39

## 2023-07-20 RX ADMIN — DEXAMETHASONE SODIUM PHOSPHATE 4 MG: 4 INJECTION, SOLUTION INTRA-ARTICULAR; INTRALESIONAL; INTRAMUSCULAR; INTRAVENOUS; SOFT TISSUE at 12:17

## 2023-07-20 RX ADMIN — GLYCOPYRROLATE 0.6 MG: 0.2 INJECTION, SOLUTION INTRAMUSCULAR; INTRAVENOUS at 13:41

## 2023-07-20 ASSESSMENT — ACTIVITIES OF DAILY LIVING (ADL)
ADLS_ACUITY_SCORE: 35

## 2023-07-20 ASSESSMENT — ENCOUNTER SYMPTOMS: DYSRHYTHMIAS: 0

## 2023-07-20 NOTE — PHARMACY-ADMISSION MEDICATION HISTORY
Pharmacist Admission Medication History    Admission medication history completed by pre-admitting RN, Melony Otero. The information provided in this note is only as accurate as the sources available at the time of the update. No further clarification needed.       Prior to Admission medications    Medication Sig Last Dose Taking? Auth Provider Long Term End Date   allopurinol (ZYLOPRIM) 100 MG tablet Take 2 tablets (200 mg) by mouth daily 7/19/2023 at 2200 Yes Jose Beck MD     aspirin 81 MG EC tablet Take 81 mg by mouth daily Past Month Yes Reported, Patient     atorvastatin (LIPITOR) 10 MG tablet [ATORVASTATIN (LIPITOR) 10 MG TABLET] Take 1 tablet (10 mg total) by mouth daily. 7/19/2023 at 2200 Yes Kev Wilson MD     fish oil-omega-3 fatty acids 1000 MG capsule Take 1 g by mouth daily Past Month Yes Reported, Patient     indomethacin (INDOCIN) 25 MG capsule [INDOMETHACIN (INDOCIN) 25 MG CAPSULE] Take 1 capsule (25 mg total) by mouth 4 (four) times a day as needed. More than a month Yes Kev Wilson MD     levothyroxine (SYNTHROID/LEVOTHROID) 50 MCG tablet Take 2 tablets by mouth on Mondays, Wednesdays, and Fridays.  Take 1 tablet by mouth all other days of the week. 7/19/2023 at 2200 Yes Jose Beck MD Yes    multivitamin, therapeutic (THERA-VIT) TABS tablet Take 1 tablet by mouth daily 7/19/2023 at 2200 Yes Reported, Patient     nitroglycerin (NITROSTAT) 0.4 MG SL tablet [NITROGLYCERIN (NITROSTAT) 0.4 MG SL TABLET] Place 0.4 mg under the tongue every 5 (five) minutes as needed for chest pain. More than a month Yes Provider, Historical     pantoprazole (PROTONIX) 40 MG tablet [PANTOPRAZOLE (PROTONIX) 40 MG TABLET] Take 1 tablet (40 mg total) by mouth daily. 7/20/2023 at 0800 Yes Kev Wilson MD     vitamin C 250 MG tablet Take 250 mg by mouth daily 7/19/2023 Yes Reported, Patient     zolpidem (AMBIEN) 5 MG tablet 1/2 TABLET 1-2 TIMES PER MONTH AS NEEDED (FOR  TRAVEL) More than a month Yes Reported, Patient

## 2023-07-20 NOTE — ANESTHESIA PROCEDURE NOTES
Airway       Patient location during procedure: OR       Procedure Start/Stop Times: 7/20/2023 12:19 PM  Staff -        CRNA: Jolene iBngham APRN CRNA       Performed By: CRNA  Consent for Airway        Urgency: elective  Indications and Patient Condition       Indications for airway management: lena-procedural       Induction type:intravenous       Mask difficulty assessment: 1 - vent by mask    Final Airway Details       Final airway type: endotracheal airway       Successful airway: ETT - single  Endotracheal Airway Details        Cuffed: yes       Successful intubation technique: direct laryngoscopy       DL Blade Type: Ir[place] 2       Grade View of Cords: 1       Adjucts: stylet       Position: Right       Measured from: gums/teeth       Secured at (cm): 23       Bite block used: None    Post intubation assessment        Placement verified by: capnometry, equal breath sounds and chest rise        Number of attempts at approach: 1       Number of other approaches attempted: 0       Secured with: plastic tape       Ease of procedure: easy       Dentition: Unchanged    Medication(s) Administered   Medication Administration Time: 7/20/2023 12:19 PM

## 2023-07-20 NOTE — INTERVAL H&P NOTE
"I have reviewed the surgical (or preoperative) H&P that is linked to this encounter, and examined the patient. There are no significant changes    Clinical Conditions Present on Arrival:  Clinically Significant Risk Factors Present on Admission                 # Drug Induced Platelet Defect: home medication list includes an antiplatelet medication  # Overweight: Estimated body mass index is 26.54 kg/m  as calculated from the following:    Height as of this encounter: 1.778 m (5' 10\").    Weight as of this encounter: 83.9 kg (185 lb).       "

## 2023-07-20 NOTE — OP NOTE
[facility]  Operative Note    Pre-operative diagnosis: Gross hematuria [R31.0]  Bladder stone [N21.0]  Right kidney stone [N20.0]  Hypervascular lesion of urinary bladder [N32.9]   Post-operative diagnosis Gross hematuria [R31.0]  Bladder stone [N21.0]  Right kidney stone [N20.0]  Hypervascular lesion of urinary bladder [N32.9]     Procedure: Procedure(s):  Cystoscopy  Transurethral resection of bladder tumor medium 2-5 cm  Right ureteroscopy with thulium fiber laser lithotripsy  Right ureteroscopy with stone basketing  Right retrograde pyelogram  Right ureteral stent placement  Bladder stone removal simple (5 mm)  Fluoroscopic interpretation <1 hour physician time   Surgeon: Saran Fairchild MD   Assistants(s): none   Anesthesia: General    Estimated blood loss: Less than 10 ml    Total IV fluids: (See anesthesia record)   Blood transfusion: No transfusion was given during surgery   Total urine output: Not measured   Drains: 20 Fr Linda catheter  6 Fr x 24 cm right ureteral stent   Specimens: ID Type Source Tests Collected by Time Destination   1 : BLADDER LESION Tissue Urinary Bladder SURGICAL PATHOLOGY EXAM Saran Fairchild MD 7/20/2023  1:29 PM    A : RIGHT KIDNEY STONE Calculus/Stone Kidney, Right STONE ANALYSIS Saran Fairchild MD 7/20/2023  1:04 PM    B : BLADDER STONE Calculus/Stone Urinary Bladder STONE ANALYSIS Saran Fairchild MD 7/20/2023  1:24 PM         Implants: Implant Name Type Inv. Item Serial No.  Lot No. LRB No. Used Action   STENT URETERAL POLARIS ULTRA [id number] - [id number] Stent STENT URETERAL POLARIS ULTRA [id number]  [employer] [id number] Right 1 Implanted          Findings:   5 mm bladder stone, irrigated out  Moderate to severe right hydronephrosis with proximal ureteral tortuosity. Multiple right lower pole kidney stones, up to 7 mm, <2.5 cm total stone burden, laser dusted/fragmented and basketed, 95% stone free  2.5 cm  area of sessile erythematous bladder lesion along the left dome, resected down to muscle fibers   Complications: None.   Condition: Stable               Description of procedure:  72-year-old man with history of nephrolithiasis, gross hematuria, elevated PSA with history of negative biopsy in 2021, BPH without significant symptoms, now found to have a hypervascular bladder lesion as well as a bladder stone on office cystoscopy with nonobstructive asymptomatic right renal stones which have grown since prior imaging.  I discussed with the patient ureteroscopic management of the right renal stones as well as removal of the bladder stone using a laser.  I will also perform a transurethral resection of bladder tumor for diagnosis and treatment of the erythematous bladder lesion at the dome.  Risks of ureteroscopy including need for secondary procedure, ureteral injury, infection, hematuria, stent pain and irritation were discussed.  Risks of TURBT including bladder perforation, bleeding, infection, hematuria were discussed.  Informed consent was obtained.    The patient was brought to operating room #14.  Ancef antibiotics were given prior to the procedure.  General anesthesia was induced, he was placed in dorsolithotomy position, prepped and draped in standard sterile fashion.  A timeout was performed.    A 22 [place] Storz cystoscope was assembled and passed through the urethra into the bladder.  Anterior urethra was normal.  Prostatic urethra was 3 cm with moderate bilobar hypertrophy.  The bladder was entered.  A 5 mm bladder stone was seen dependently and this was irrigated out and sent for stone analysis.  A 2.5 cm sessile erythematous lesion at the dome of the bladder was noted.  Attention was first turned to the ureteroscopy.  The right ureteral orifice was identified.  This was cannulated with a 5 Q[place] tiger tail catheter.  A gentle retrograde pyelogram showed moderate to severe right hydroureteronephrosis with  proximal ureteral tortuosity.  The course of the ureter appeared to go quite medially over the spine.  A 0.035 inch stiff shaft Glidewire was passed through the tiger tail catheter up to the renal pelvis and the tiger tail catheter was removed.  The scope was removed and the wire was clipped to the drapes as a safety wire.  A Storz long semirigid ureteroscope was assembled and passed through the urethra, into the bladder and up the right ureteral orifice.  The ureter was widely patent and I was able to pass the ureteroscope all the way up to the ureteropelvic junction with ease.  A second wire was then passed through the scope up into the renal pelvis and the scope was backloaded off the wire.  Over the working wire a [employer] Navigator 11/13 [place] by 46 cm ureteral access sheath was passed up to the proximal ureter under fluoroscopic guidance and the obturator and wire were removed.  A [employer] Lithovue disposable flexible ureteroscope was then passed up the access sheath to the renal pelvis.  The ureteropelvic junction was somewhat tortuous and difficult to navigate.  The renal pelvis had moderate to severe hydronephrosis with widely dilated renal calyces.  In the lower pole I found several stones 5 to 7 mm in diameter.  The Olympus Soltive thulium fiber laser was then used to dust and fragment the stones.  Large stone fragments were removed using a [place] Halo basket.  The remainder of the stone burden was then dusted thoroughly into fragments <1 mm in diameter expected to pass spontaneously on their own.  Estimate 95% stone free.  Repeat retrograde pyelogram was performed to provide a landing zone for the stent.  Pullout ureteroscopy revealed the ureter to be in good condition with no tears or lacerations.  There were several ureteral stone fragments which had been flushed down from the kidney which I basketed out.  Once the ureteroscope and ureteral access sheath were removed I then  placed a stent over the safety wire in the usual fashion under cystoscopic and fluoroscopic guidance with good coils noted proximally and distally.  Attention was then turned to the transurethral resection of bladder tumor.  The cystoscope was removed and exchanged for a 26 [place] Storz continuous-flow resectoscope using a Aguilar visual obturator.  Visual obturator was then exchanged for the monopolar loop working element.  The irrigant fluid was switched from saline over to sterile water.  The sessile area was then identified at the dome of the bladder.  This was carefully resected down to muscle fibers. Specimen was sent for permanent pathology.  Hemostasis was achieved using coagulation settings.  The scope was removed and a Linda catheter was placed and left to gravity drainage.  The Linda was irrigated with return of clear effluent.  There was some bleeding around the urethra so I used a gauze fluff to tamponade the bleeding.  The patient was then cleaned up, awoken from anesthesia and brought to PACU in stable condition.  We will plan to discharge him home as long as the urine stays clear pink without any clots.  He will then return in about 1 week for removal of the Linda    Saran Fairchild MD   [facility]  [phone] clinic phone

## 2023-07-20 NOTE — ANESTHESIA POSTPROCEDURE EVALUATION
Patient: Filemon Leon    Procedure: Procedure(s):  Cystoscopy, transurethral resection of bladder tumor  Right ureteroscopy with thulium fiber laser lithotripsy, right ureteroscopy with stone basketing, right retrograde pyelogram, right ureteral stent placement  Bladder stone removal simple <2.5 cm       Anesthesia Type:  General    Note:  Disposition: Outpatient   Postop Pain Control: Uneventful            Sign Out: Well controlled pain   PONV: No   Neuro/Psych: Uneventful            Sign Out: Acceptable/Baseline neuro status   Airway/Respiratory: Uneventful            Sign Out: Acceptable/Baseline resp. status   CV/Hemodynamics: Uneventful            Sign Out: Acceptable CV status; No obvious hypovolemia; No obvious fluid overload   Other NRE:    DID A NON-ROUTINE EVENT OCCUR?            Last vitals:  Vitals Value Taken Time   /85 07/20/23 1500   Temp 97  F (36.1  C) 07/20/23 1445   Pulse 51 07/20/23 1514   Resp 13 07/20/23 1514   SpO2 98 % 07/20/23 1514   Vitals shown include unvalidated device data.    Electronically Signed By: Galen Milton MD  July 20, 2023  3:16 PM

## 2023-07-20 NOTE — ANESTHESIA CARE TRANSFER NOTE
Patient: Filemon Leon    Procedure: Procedure(s):  Cystoscopy, transurethral resection of bladder tumor  Right ureteroscopy with thulium fiber laser lithotripsy, right ureteroscopy with stone basketing, right retrograde pyelogram, right ureteral stent placement  Bladder stone removal simple <2.5 cm       Diagnosis: Gross hematuria [R31.0]  Bladder stone [N21.0]  Right kidney stone [N20.0]  Hypervascular lesion of urinary bladder [N32.9]  Diagnosis Additional Information: No value filed.    Anesthesia Type:   General     Note:    Oropharynx: oropharynx clear of all foreign objects  Level of Consciousness: awake  Oxygen Supplementation: face mask  Level of Supplemental Oxygen (L/min / FiO2): 10  Independent Airway: airway patency satisfactory and stable  Dentition: dentition unchanged  Vital Signs Stable: post-procedure vital signs reviewed and stable  Report to RN Given: handoff report given  Patient transferred to: PACU    Handoff Report: Identifed the Patient, Identified the Reponsible Provider, Reviewed the pertinent medical history, Discussed the surgical course, Reviewed Intra-OP anesthesia mangement and issues during anesthesia, Set expectations for post-procedure period and Allowed opportunity for questions and acknowledgement of understanding      Vitals:  Vitals Value Taken Time   /98 07/20/23 1359   Temp     Pulse 81 07/20/23 1401   Resp 10 07/20/23 1401   SpO2 99 % 07/20/23 1401   Vitals shown include unvalidated device data.    Electronically Signed By: BURT Valle CRNA  July 20, 2023  2:02 PM

## 2023-07-20 NOTE — DISCHARGE INSTRUCTIONS
POSTOPERATIVE INSTRUCTIONS    Diagnosis-------------------------------   Bladder lesion  Bladder stone  Right kidney stones    Procedure-------------------------------  Procedure(s) (LRB):  Cystoscopy, transurethral resection of bladder tumor (N/A)  Right ureteroscopy with thulium fiber laser lithotripsy, right ureteroscopy with stone basketing, right retrograde pyelogram, right ureteral stent placement (Right)  Bladder stone removal simple <2.5 cm (N/A)      Findings--------------------------------  5 mm bladder stone, irrigated out  Moderate to severe right hydronephrosis with proximal ureteral tortuosity, Multiple right lower pole kidney stones, up to 7 mm, <2.5 cm total stone burden, laser dusted/fragmented and basketed, 95% stone free  2.5 cm area of sessile erythematous bladder lesion along the left dome, resected down to muscle fibers    Home-going instructions-----------------         Activity Limitation:     - No driving or operating heavy machinery while on narcotic pain medication.   - no heavy lifting or strenuous activity for one month    FOLLOW THESE INSTRUCTIONS AS INDICATED BELOW:  - Observe operative area for signs of excessive bleeding.  - You may shower.  - Increase fluid intake to promote clear urine.  - Resume usual diet as tolerated    What to expect while recovering-----------  - You may experience some intermittent bleeding that makes your urine pink or cherry colored. This is normal.  - However, if you are unable to urinate, passing large amount of clots, have rosalio blood in your urine, or have a temperature >101 degrees, call the urology nurse on call, or present to your nearest emergency department.  - A URETERAL STENT has been placed that allows urine to flow unobstructed from your kidney into your bladder.  The stent has a curl in the kidney and a curl in the bladder.  The curl in the bladder can cause some urgency and frequency of urination as well as some mild blood in the urine.  The  curl in the kidney can cause some mild flank discomfort.  This may be more noticeable when you urinate.  A URETERAL STENT is meant to be left in temporarily.  It must be removed or changed no later than 3 months after its insertion.  If it's not removed it can result in stone overgrowth on the stent that can cause pain, infection, and can be very difficult to remove.        Discharge Medications/instructions:   - Flomax (tamsulosin) to be taken daily until stent is removed  - Antibiotics (ciprofloxacin) are to be taken with you to your scheduled return visit for Linda removal  - Take Tylenol 1000mg every 6 hours for pain  Maximum acetaminophen (Tylenol) dose from all sources should not exceed 4 grams (4000 mg) per day.  - Take Oxycodone 5mg every 6 hours only for break through pain  - Take Colace while taking Oxycodone to prevent constipation  - take oxybutinin (an anti spasm medication) for bladder spasm while the catheter is in place      Questions/concerns------------------------  [facility]: [phone]    Future appointments  Follow up in about a week for Linda removal, and in 10-14 days for cystoscopy with stent removal      Saran Fairchild MD   GENERAL ANESTHESIA ADULT DISCHARGE INSTRUCTIONS   SPECIAL PRECAUTIONS FOR 24 HOURS AFTER SURGERY    IT IS NOT UNUSUAL TO FEEL LIGHT-HEADED OR FAINT, UP TO 24 HOURS AFTER SURGERY OR WHILE TAKING PAIN MEDICATION.  IF YOU HAVE THESE SYMPTOMS; SIT FOR A FEW MINUTES BEFORE STANDING AND HAVE SOMEONE ASSIST YOU WHEN YOU GET UP TO WALK OR USE THE BATHROOM.    YOU SHOULD REST AND RELAX FOR THE NEXT 24 HOURS AND YOU MUST MAKE ARRANGEMENTS TO HAVE SOMEONE STAY WITH YOU FOR AT LEAST 24 HOURS AFTER YOUR DISCHARGE.  AVOID HAZARDOUS AND STRENUOUS ACTIVITIES.  DO NOT MAKE IMPORTANT DECISIONS FOR 24 HOURS.    DO NOT DRIVE ANY VEHICLE OR OPERATE MECHANICAL EQUIPMENT FOR 24 HOURS FOLLOWING THE END OF YOUR SURGERY.  EVEN THOUGH YOU MAY FEEL NORMAL, YOUR  REACTIONS MAY BE AFFECTED BY THE MEDICATION YOU HAVE RECEIVED.    DO NOT DRINK ALCOHOLIC BEVERAGES FOR 24 HOURS FOLLOWING YOUR SURGERY.    DRINK CLEAR LIQUIDS (APPLE JUICE, GINGER ALE, 7-UP, BROTH, ETC.).  PROGRESS TO YOUR REGULAR DIET AS YOU FEEL ABLE.    YOU MAY HAVE A DRY MOUTH, A SORE THROAT, MUSCLES ACHES OR TROUBLE SLEEPING.  THESE SHOULD GO AWAY AFTER 24 HOURS.    CALL YOUR DOCTOR FOR ANY OF THE FOLLOWING:  SIGNS OF INFECTION (FEVER, GROWING TENDERNESS AT THE SURGERY SITE, A LARGE AMOUNT OF DRAINAGE OR BLEEDING, SEVERE PAIN, FOUL-SMELLING DRAINAGE, REDNESS OR SWELLING.    IT HAS BEEN OVER 8 TO 10 HOURS SINCE SURGERY AND YOU ARE STILL NOT ABLE TO URINATE (PASS WATER).

## 2023-07-20 NOTE — ANESTHESIA PREPROCEDURE EVALUATION
Anesthesia Pre-Procedure Evaluation    Patient: Filemon Leon   MRN: [id number] : 1950        Procedure : Procedure(s):  Cystoscopy, transurethral resection of bladder tumor  Right ureteroscopy with thulium fiber laser lithotripsy, right ureteroscopy with stone basketing, right retrograde pyelogram, right ureteral stent placement  Thulium fiber laser cystolitholapaxy simple <2.5 cm          Past Medical History:   Diagnosis Date     Arthritis 01/15/1997    Osteoarthritis found in both knees     Cancer (H) 09/15/2006    Basal Cell carcinoma-1 location     Diverticulosis of large intestine      Heart disease 09/15/2005    Heart stents after partial blockage identified     Hernia, abdominal      High cholesterol      History of blood transfusion 04/10/2000    Due to blood loss after total knee replacements of both knees     Renal disease     Kidney stones, possible bladder tumor     Stented coronary artery      Thyroid disease     hypothyroid      Past Surgical History:   Procedure Laterality Date     ARTHROPLASTY SHOULDER Right     Reverse total shoulder replacement     ARTHROSCOPY KNEE Left      AS KNEE SCOPE,MED/LAT MENISCUS REPAIR Bilateral     open repair of both meniscus'     AS TOTAL HIP ARTHROPLASTY Bilateral 2014     CARDIAC SURGERY  09/15/2005    Heart stents     COLONOSCOPY  2022    Most recent     CYSTOSCOPY       HERNIA REPAIR      Incarcerated left inguinal hernia, and 4 others     JOINT REPLACEMENT Bilateral     CRISTOBAL bilat     LASIK BILATERAL       NASAL SEPTUM SURGERY       ZZC TOTAL KNEE ARTHROPLASTY Bilateral     Description: Total Knee Arthroplasty;  Recorded: 2014;      Allergies   Allergen Reactions     Simvastatin Other (See Comments)     myalgia      Social History     Tobacco Use     Smoking status: Former     Packs/day: 1.00     Years: 5.00     Pack years: 5.00     Types: Cigarettes     Start date: 1972     Quit date: 1/15/1977     Years since quittin.5      Smokeless tobacco: Never   Substance Use Topics     Alcohol use: Not Currently      Wt Readings from Last 1 Encounters:   07/20/23 83.9 kg (185 lb)        Anesthesia Evaluation   Pt has had prior anesthetic. Type: General and Regional.    No history of anesthetic complications       ROS/MED HX  ENT/Pulmonary:    (-) sleep apnea   Neurologic:  - neg neurologic ROS     Cardiovascular:     (+) hypertension--CAD --stent- (-) taking anticoagulants/antiplatelets, arrhythmias and pulmonary hypertension   METS/Exercise Tolerance: >4 METS    Hematologic:       Musculoskeletal: Comment: Multiple joint replacements  (+) arthritis,     GI/Hepatic:     (+) GERD,     Renal/Genitourinary: Comment: Bladder tumor    (+) renal disease, Nephrolithiasis ,     Endo:     (+) thyroid problem, hypothyroidism,     Psychiatric/Substance Use:    (-) psychiatric history   Infectious Disease:  - neg infectious disease ROS     Malignancy:       Other:            Physical Exam    Airway        Mallampati: II   TM distance: > 3 FB   Neck ROM: full     Respiratory Devices and Support         Dental           Cardiovascular   cardiovascular exam normal          Pulmonary   pulmonary exam normal            Other findings: Lab Test        07/14/23 11/28/22                       0929          1024          WBC          6.4          9.1           HGB          14.4         14.5          MCV          89           89            PLT          194          215            Lab Test        07/14/23 06/07/23 11/28/22                       0929          0856          1024          NA           142           --          138           POTASSIUM    4.2           --          4.3           CHLORIDE     106           --          105           CO2          26            --          21*           BUN          28.2*         --          16.9          CR           0.93         1.0          0.81          ANIONGAP     10            --          12            KARI           9.1           --          8.9           GLC          81            --          97              OUTSIDE LABS:  CBC:   Lab Results   Component Value Date    WBC 6.4 07/14/2023    WBC 9.1 11/28/2022    HGB 14.4 07/14/2023    HGB 14.5 11/28/2022    HCT 42.3 07/14/2023    HCT 42.3 11/28/2022     07/14/2023     11/28/2022     BMP:   Lab Results   Component Value Date     07/14/2023     11/28/2022    POTASSIUM 4.2 07/14/2023    POTASSIUM 4.3 11/28/2022    CHLORIDE 106 07/14/2023    CHLORIDE 105 11/28/2022    CO2 26 07/14/2023    CO2 21 (L) 11/28/2022    BUN 28.2 (H) 07/14/2023    BUN 16.9 11/28/2022    CR 0.93 07/14/2023    CR 1.0 06/07/2023    GLC 81 07/14/2023    GLC 97 11/28/2022     COAGS: No results found for: PTT, INR, FIBR  POC: No results found for: BGM, HCG, HCGS  HEPATIC:   Lab Results   Component Value Date    ALBUMIN 4.1 11/28/2022    PROTTOTAL 6.3 (L) 11/28/2022    ALT 30 11/28/2022    AST 45 11/28/2022    ALKPHOS 76 11/28/2022    BILITOTAL 0.6 11/28/2022     OTHER:   Lab Results   Component Value Date    KARI 9.1 07/14/2023    TSH 3.14 07/14/2023       Anesthesia Plan    ASA Status:  3      Anesthesia Type: General.     - Airway: ETT   Induction: Propofol.   Maintenance: Balanced.        Consents    Anesthesia Plan(s) and associated risks, benefits, and realistic alternatives discussed. Questions answered and patient/representative(s) expressed understanding.    - Discussed:     - Discussed with:  Patient      - Extended Intubation/Ventilatory Support Discussed: No.      - Patient is DNR/DNI Status: No    Use of blood products discussed: No .     Postoperative Care    Pain management: IV analgesics, Oral pain medications.   PONV prophylaxis: Ondansetron (or other 5HT-3), Background Propofol Infusion     Comments:                Galen Milton MD

## 2023-07-21 VITALS
TEMPERATURE: 97.8 F | HEART RATE: 60 BPM | BODY MASS INDEX: 26.48 KG/M2 | DIASTOLIC BLOOD PRESSURE: 68 MMHG | HEIGHT: 70 IN | SYSTOLIC BLOOD PRESSURE: 120 MMHG | RESPIRATION RATE: 19 BRPM | WEIGHT: 185 LBS | OXYGEN SATURATION: 96 %

## 2023-07-21 PROCEDURE — 250N000013 HC RX MED GY IP 250 OP 250 PS 637: Performed by: STUDENT IN AN ORGANIZED HEALTH CARE EDUCATION/TRAINING PROGRAM

## 2023-07-21 PROCEDURE — 99212 OFFICE O/P EST SF 10 MIN: CPT | Performed by: PHYSICIAN ASSISTANT

## 2023-07-21 RX ADMIN — OXYCODONE HYDROCHLORIDE 5 MG: 5 TABLET ORAL at 04:46

## 2023-07-21 RX ADMIN — BENZOCAINE 6 MG-MENTHOL 10 MG LOZENGES 1 LOZENGE: at 01:25

## 2023-07-21 RX ADMIN — ACETAMINOPHEN 650 MG: 325 TABLET, FILM COATED ORAL at 06:27

## 2023-07-21 RX ADMIN — TAMSULOSIN HYDROCHLORIDE 0.4 MG: 0.4 CAPSULE ORAL at 07:20

## 2023-07-21 RX ADMIN — ACETAMINOPHEN 650 MG: 325 TABLET, FILM COATED ORAL at 00:02

## 2023-07-21 RX ADMIN — PANTOPRAZOLE SODIUM 40 MG: 40 TABLET, DELAYED RELEASE ORAL at 07:20

## 2023-07-21 ASSESSMENT — ACTIVITIES OF DAILY LIVING (ADL)
ADLS_ACUITY_SCORE: 35

## 2023-07-21 NOTE — PLAN OF CARE
"PRIMARY DIAGNOSIS: Gross hematuria, s/p cystoscopy, transurethral resection of bladder, right stent placement.    OUTPATIENT/OBSERVATION GOALS TO BE MET BEFORE DISCHARGE  1. Pain Status: Pain free.    2. Tolerating adequate PO diet: Yes    3. Surgical Intervention planned: No    4. Cleared by consultants (if involved): Yes    5. Return to near baseline physical activity: Yes    Discharge Planner Nurse   Safe discharge environment identified: Yes  Barriers to discharge: No  Pt A & O X4, lung sounds clear bilaterally, denies SOB, SL R hand, ferris cath patent with red discharge, seen by Urology and cleared, awaiting official discharge orders.  /68 (BP Location: Left arm)   Pulse 60   Temp 97.8  F (36.6  C) (Oral)   Resp 19   Ht 1.778 m (5' 10\")   Wt 83.9 kg (185 lb)   SpO2 96%   BMI 26.54 kg/m              Please review provider order for any additional goals.   Nurse to notify provider when observation goals have been met and patient is ready for discharge.      "

## 2023-07-21 NOTE — PLAN OF CARE
"[id number]    Inpatient Progress Note:    /68 (BP Location: Left arm)   Pulse 60   Temp 97.8  F (36.6  C) (Oral)   Resp 19   Ht 1.778 m (5' 10\")   Wt 83.9 kg (185 lb)   SpO2 96%   BMI 26.54 kg/m         Orientation: A & OX4  Neuro: intact  Pain status: Pt denies pain, expect with little abdomen movement    Activity:  independent tolerated  Resp: Lung sounds clear bilaterally   Cardiac: within normal limits  GI/:  Discharged with ferris, red discharge noted  Skin: intact/clean/dry  LDA: R hand taken out   Infusions: N/A  Pertinent Labs:   Diet: Regular  Consults: Urology cleared  Discharge Plan: Discharge home with ferris cath    Will continue to monitor and provide cares.     Karoline Sue RN          "

## 2023-07-21 NOTE — DISCHARGE SUMMARY
[facility] Discharge Summary: Urology    Filemon Leon MRN# [id number]   Age: 72 year old YOB: 1950     Date of Admission:  7/20/2023  Date of Discharge::  7/21/2023  Admitting Physician:  Saran Fairchild MD  Discharge Physician:  Jennifer Holloway PA-C           Admission Diagnoses:      Right kidney stone  Hypervascular lesion of urinary bladder           Discharge Diagnosis:     Right kidney stone  Hypervascular lesion of urinary bladder  Bladder stone          Procedures:   Cystoscopy  Transurethral resection of bladder tumor medium 2-5 cm  Right ureteroscopy with thulium fiber laser lithotripsy  Right ureteroscopy with stone basketing  Right retrograde pyelogram  Right ureteral stent placement  Bladder stone removal simple (5 mm)          Medications Prior to Admission:     Medications Prior to Admission   Medication Sig Dispense Refill Last Dose     allopurinol (ZYLOPRIM) 100 MG tablet Take 2 tablets (200 mg) by mouth daily   7/19/2023 at 2200     aspirin 81 MG EC tablet Take 81 mg by mouth daily   Past Month     atorvastatin (LIPITOR) 10 MG tablet [ATORVASTATIN (LIPITOR) 10 MG TABLET] Take 1 tablet (10 mg total) by mouth daily. 90 tablet 3 7/19/2023 at 2200     fish oil-omega-3 fatty acids 1000 MG capsule Take 1 g by mouth daily   Past Month     indomethacin (INDOCIN) 25 MG capsule [INDOMETHACIN (INDOCIN) 25 MG CAPSULE] Take 1 capsule (25 mg total) by mouth 4 (four) times a day as needed. 30 capsule 5 More than a month     levothyroxine (SYNTHROID/LEVOTHROID) 50 MCG tablet Take 2 tablets by mouth on Mondays, Wednesdays, and Fridays.  Take 1 tablet by mouth all other days of the week.   7/19/2023 at 2200     multivitamin, therapeutic (THERA-VIT) TABS tablet Take 1 tablet by mouth daily   7/19/2023 at 2200     nitroglycerin (NITROSTAT) 0.4 MG SL tablet [NITROGLYCERIN (NITROSTAT) 0.4 MG SL TABLET] Place 0.4 mg under the tongue every 5 (five) minutes as needed for chest pain.   More than  a month     pantoprazole (PROTONIX) 40 MG tablet [PANTOPRAZOLE (PROTONIX) 40 MG TABLET] Take 1 tablet (40 mg total) by mouth daily. 180 tablet 3 7/20/2023 at 0800     vitamin C 250 MG tablet Take 250 mg by mouth daily   7/19/2023     zolpidem (AMBIEN) 5 MG tablet 1/2 TABLET 1-2 TIMES PER MONTH AS NEEDED (FOR TRAVEL)   More than a month             Discharge Medications:     Current Discharge Medication List      START taking these medications    Details   acetaminophen (TYLENOL) 500 MG tablet Take 2 tablets (1,000 mg) by mouth every 6 hours as needed for mild pain  Qty: 100 tablet, Refills: 0    Associated Diagnoses: Right kidney stone; Hypervascular lesion of urinary bladder      docusate sodium (COLACE) 100 MG capsule Take 1 capsule (100 mg) by mouth 2 times daily  Qty: 30 capsule, Refills: 0    Associated Diagnoses: Right kidney stone; Hypervascular lesion of urinary bladder      oxybutynin (DITROPAN) 5 MG tablet Take 1 tablet (5 mg) by mouth 3 times daily as needed for bladder spasms  Qty: 15 tablet, Refills: 0    Associated Diagnoses: Right kidney stone; Hypervascular lesion of urinary bladder      oxyCODONE (ROXICODONE) 5 MG tablet Take 1 tablet (5 mg) by mouth every 6 hours as needed for severe pain  Qty: 6 tablet, Refills: 0    Associated Diagnoses: Right kidney stone; Hypervascular lesion of urinary bladder      tamsulosin (FLOMAX) 0.4 MG capsule Take 1 capsule (0.4 mg) by mouth daily While the stent is in place, for stent pain and irritation  Qty: 14 capsule, Refills: 0    Associated Diagnoses: Right kidney stone; Hypervascular lesion of urinary bladder         CONTINUE these medications which have NOT CHANGED    Details   allopurinol (ZYLOPRIM) 100 MG tablet Take 2 tablets (200 mg) by mouth daily      aspirin 81 MG EC tablet Take 81 mg by mouth daily      atorvastatin (LIPITOR) 10 MG tablet [ATORVASTATIN (LIPITOR) 10 MG TABLET] Take 1 tablet (10 mg total) by mouth daily.  Qty: 90 tablet, Refills: 3       fish oil-omega-3 fatty acids 1000 MG capsule Take 1 g by mouth daily      indomethacin (INDOCIN) 25 MG capsule [INDOMETHACIN (INDOCIN) 25 MG CAPSULE] Take 1 capsule (25 mg total) by mouth 4 (four) times a day as needed.  Qty: 30 capsule, Refills: 5      levothyroxine (SYNTHROID/LEVOTHROID) 50 MCG tablet Take 2 tablets by mouth on Mondays, Wednesdays, and Fridays.  Take 1 tablet by mouth all other days of the week.      multivitamin, therapeutic (THERA-VIT) TABS tablet Take 1 tablet by mouth daily      nitroglycerin (NITROSTAT) 0.4 MG SL tablet [NITROGLYCERIN (NITROSTAT) 0.4 MG SL TABLET] Place 0.4 mg under the tongue every 5 (five) minutes as needed for chest pain.      pantoprazole (PROTONIX) 40 MG tablet [PANTOPRAZOLE (PROTONIX) 40 MG TABLET] Take 1 tablet (40 mg total) by mouth daily.  Qty: 180 tablet, Refills: 3      vitamin C 250 MG tablet Take 250 mg by mouth daily      zolpidem (AMBIEN) 5 MG tablet 1/2 TABLET 1-2 TIMES PER MONTH AS NEEDED (FOR TRAVEL)         STOP taking these medications       ciprofloxacin (CIPRO) 500 MG tablet Comments:   Reason for Stopping:                     Consultations:     None          Hospital Course:     The patient underwent the above procedure and tolerated this well. Uncomplicated post operative course. Had adequate pain control, ambulating and tolerating regular diet at the time of discharge.            Discharge Instructions and Follow-Up:     Discharge diet: Regular   Discharge activity: No lifting >10lbs or strenuous exercise for 1 weeks   Discharge follow-up: 7/28/23 Linda removal.  8/4/23 stent removal with Dr. Fairchild    Wound care: NA           Discharge Disposition:     Discharged to home        Jennifer Holloway PA-C   [facility] Urology

## 2023-07-21 NOTE — PROVIDER NOTIFICATION
Paged X-Cover Provider regarding patient C/O sore or dry throat.  Obtained orders for Benzocaine-menthol

## 2023-07-21 NOTE — PLAN OF CARE
PRIMARY DIAGNOSIS: Gross hematuria , s/p Cystoscopy, Transurethral resection of bladder, Right ureteral stent placement .    OUTPATIENT/OBSERVATION GOALS TO BE MET BEFORE DISCHARGE  1. Orthostatic performed: N/A    2. Tolerating PO medications: Yes    3. Return to near baseline physical activity: No    4. Cleared for discharge by consultants (if involved): No    Discharge Planner Nurse   Safe discharge environment identified: Yes  Barriers to discharge: Yes       Entered by: Bibiana Xiong RN 07/21/2023 2:08 AM     Please review provider order for any additional goals.   Nurse to notify provider when observation goals have been met and patient is ready for discharge.

## 2023-07-21 NOTE — PROVIDER NOTIFICATION
Patient requested to be taken off Capnography so he can have a good night sleep. Educated the benefit and risks of Capnography post surgical . Patient expressed understanding .

## 2023-07-21 NOTE — PLAN OF CARE
"PRIMARY DIAGNOSIS: Gross hematuria , s/p Cystoscopy, Transurethral resection of bladder, Right ureteral stent placement .    OUTPATIENT/OBSERVATION GOALS TO BE MET BEFORE DISCHARGE  1. Orthostatic performed: N/A    2. Tolerating PO medications: Yes    3. Return to near baseline physical activity: No    4. Cleared for discharge by consultants (if involved): No    Discharge Planner Nurse   Safe discharge environment identified: Yes  Barriers to discharge: Yes   A & O X 4. Pleasant . Lung sound clear bilaterally to auscultation. Denies abdominal cramping , shortness of breath, lightheadedness, nausea, vomit, or diarrhea. Peripheral IV saline lock. Patient refused capnography to promote sleep. Educated patient benefits and risk of capnography post surgical. Patient express understanding . Linda catheter patent with adequate red output. CMS intact. PCDs on.  C/O sore or dry throat. Prn Benzocaine -menthol given X 1. Pain is managed with scheduled Tylenol , and Prn Oxycodone. Afebrile. Urology following.  /80 (BP Location: Right arm)   Pulse 65   Temp 98  F (36.7  C) (Oral)   Resp 14   Ht 1.778 m (5' 10\")   Wt 83.9 kg (185 lb)   SpO2 96%   BMI 26.54 kg/m           Entered by: Bibiana Xiong RN 07/21/2023 4:31 AM     Please review provider order for any additional goals.   Nurse to notify provider when observation goals have been met and patient is ready for discharge.    "

## 2023-07-21 NOTE — PLAN OF CARE
Patient's After Visit Summary was reviewed with patient   Patient verbalized understanding of After Visit Summary, recommended follow up and was given an opportunity to ask questions.   Discharge medications sent home with patient/family: YES   Discharged with wife      Discharge Med's:  Oxycodone   Tylenol  Ciprofloxacin  Docusate sodium  Oxybutynin  Tamsulosin     OBSERVATION patient END time: 11:35 am

## 2023-07-21 NOTE — PROGRESS NOTES
[facility] Urology Progress Note          Assessment and Plan:     Assessment:    POD 1 cystoscopy, transurethral resection of bladder tumor, medium 2 to 5 cm, right ureteroscopy with thulium fiber laser lithotripsy, right ureteroscopy with stone basketing, right retrograde pyelogram, right ureteral stent placement, bladder stone removal simple (5 mm)    Gross hematuria    Ureteral stones    Bladder lesion      Plan:   -Continue with Linda catheter.  Patient should discharge with Linda catheter in place.  Is scheduled for removal in clinic on 07/28/2023.  -Continue with indwelling ureteral stent.  This is scheduled for removal in clinic on 08/04/2023 with Dr. Fairchild.  -Ambulate as tolerated.  -Regular diet.  -Flomax 0.4 mg once daily and oxybutynin 5 mg for stent discomfort.  -Possible side effects with an indwelling ureteral stent such as urgency and frequency of urination, dysuria, hematuria, symptoms of urine reflux, and some achiness in the side. Indwelling ureteral stents need to be exchanged every three months or removed by three months.   -Plan on discharge later this morning.  Follow-up as prescheduled.    Jennifer Holloway PA-C   [facility] Urology  [phone]               Interval History:     Doing well.  Denies pain in and lightheaded or dizziness.  Patient is afebrile without tachycardia.  Linda catheter in place draining clear cherry colored urine. Denies N/V/F/C/SOB/CP.              Review of Systems:     The 5 point Review of Systems is negative other than noted in the HPI             Medications:     Current Facility-Administered Medications Ordered in Epic   Medication Dose Route Frequency Last Rate Last Admin     acetaminophen (TYLENOL) tablet 650 mg  650 mg Oral Q6H   650 mg at 07/21/23 0627     allopurinol (ZYLOPRIM) tablet 200 mg  200 mg Oral Daily   200 mg at 07/20/23 2140     atorvastatin (LIPITOR) tablet 10 mg  10 mg Oral Daily   10 mg at 07/20/23 2140     benzocaine 20%  (HURRICAINE/TOPEX) 20 % spray 0.5-1 mL  1-2 spray Mouth/Throat Q3H PRN         benzocaine-menthol (CHLORASEPTIC) 6-10 MG lozenge 1 lozenge  1 lozenge Buccal Q1H PRN   1 lozenge at 07/21/23 0125     levothyroxine (SYNTHROID/LEVOTHROID) tablet 100 mcg  100 mcg Oral Once per day on Mon Wed Fri         levothyroxine (SYNTHROID/LEVOTHROID) tablet 50 mcg  50 mcg Oral Once per day on Sun Tue Thu Sat   50 mcg at 07/20/23 2139     lidocaine (LMX4) cream   Topical Q1H PRN         lidocaine 1 % 0.1-1 mL  0.1-1 mL Other Q1H PRN         naloxone (NARCAN) injection 0.2 mg  0.2 mg Intravenous Q2 Min PRN        Or     naloxone (NARCAN) injection 0.4 mg  0.4 mg Intravenous Q2 Min PRN        Or     naloxone (NARCAN) injection 0.2 mg  0.2 mg Intramuscular Q2 Min PRN        Or     naloxone (NARCAN) injection 0.4 mg  0.4 mg Intramuscular Q2 Min PRN         ondansetron (ZOFRAN ODT) ODT tab 4 mg  4 mg Oral Q6H PRN        Or     ondansetron (ZOFRAN) injection 4 mg  4 mg Intravenous Q6H PRN         oxyCODONE (ROXICODONE) tablet 5 mg  5 mg Oral Q4H PRN   5 mg at 07/21/23 0446    Or     oxyCODONE (ROXICODONE) tablet 10 mg  10 mg Oral Q4H PRN         pantoprazole (PROTONIX) EC tablet 40 mg  40 mg Oral Daily   40 mg at 07/21/23 0720     sodium chloride (PF) 0.9% PF flush 3 mL  3 mL Intracatheter Q8H   3 mL at 07/21/23 0007     sodium chloride (PF) 0.9% PF flush 3 mL  3 mL Intracatheter q1 min prn         tamsulosin (FLOMAX) capsule 0.4 mg  0.4 mg Oral Daily   0.4 mg at 07/21/23 0720     Current Outpatient Medications Ordered in Epic   Medication     acetaminophen (TYLENOL) 500 MG tablet     docusate sodium (COLACE) 100 MG capsule     oxybutynin (DITROPAN) 5 MG tablet     oxyCODONE (ROXICODONE) 5 MG tablet     tamsulosin (FLOMAX) 0.4 MG capsule                  Physical Exam:   Vitals were reviewed  Patient Vitals for the past 8 hrs:   BP Temp Temp src Pulse Resp SpO2   07/21/23 0748 120/68 97.8  F (36.6  C) Oral 60 19 96 %   07/21/23 0503  118/61 98  F (36.7  C) Oral 51 18 97 %     GEN: NAD, sitting up in bed  EYES: EOMI  MOUTH: MMM  NECK: Supple  RESP: Unlabored breathing  SKIN: Warm  NEURO: AAO  URO: Linda catheter in place draining clear cherry colored urine.           Data:   No results found for: NTBNPI, NTBNP  Lab Results   Component Value Date    WBC 6.4 07/14/2023    WBC 9.1 11/28/2022    HGB 14.4 07/14/2023    HGB 14.5 11/28/2022    HCT 42.3 07/14/2023    HCT 42.3 11/28/2022    MCV 89 07/14/2023    MCV 89 11/28/2022     07/14/2023     11/28/2022

## 2023-07-21 NOTE — CARE PLAN
"PRIMARY DIAGNOSIS:Cystoscopy transurethral resection of bladder tumor/removal of bladder stone.  OUTPATIENT/OBSERVATION GOALS TO BE MET BEFORE DISCHARGE:  1. Stable vital signs Yes  2. Tolerating diet:Yes  3. Pain controlled with oral pain medications:  Yes  4. Positive bowel sounds:  Yes  5. Voiding without difficulty:  Yes  6. Able to ambulate:  Yes  7. Provider specific discharge goals met:  No    Discharge Planner Nurse   Safe discharge environment identified: Yes  Barriers to discharge: Yes       Entered by: Jose M Thompson RN 07/20/2023    Please review provider order for any additional goals.   Nurse to notify provider when observation goals have been met and patient is ready for discharge.    Pt alert and oriented x 4 .VSS on RA.Pt is on Capno for overnight.Pt tolerating well for full liquid diet advanced to Regular diet.PIV SL.Pt with ferris cathter adequate urine out put.Small amount blood come out through urethra.IS,PCD is on.  /79 (BP Location: Right arm)   Pulse 64   Temp 97.9  F (36.6  C) (Oral)   Resp 11   Ht 1.778 m (5' 10\")   Wt 83.9 kg (185 lb)   SpO2 96%   BMI 26.54 kg/m    "

## 2023-07-23 LAB
APPEARANCE STONE: NORMAL
APPEARANCE STONE: NORMAL
COMPN STONE: NORMAL
COMPN STONE: NORMAL
SPECIMEN WT: 50 MG
SPECIMEN WT: 74 MG

## 2023-07-28 ENCOUNTER — ALLIED HEALTH/NURSE VISIT (OUTPATIENT)
Dept: UROLOGY | Facility: CLINIC | Age: 73
End: 2023-07-28
Payer: MEDICARE

## 2023-07-28 DIAGNOSIS — N32.9 HYPERVASCULAR LESION OF URINARY BLADDER: Primary | ICD-10-CM

## 2023-07-28 PROCEDURE — 99207 PR NO CHARGE NURSE ONLY: CPT

## 2023-07-28 NOTE — PROGRESS NOTES
Filemon Leon comes into clinic today at the request of Dr. Fairchild Ordering Provider for Catheter Removal.      Catheter removal documentation on 7/28/2023:    Filemon Leon presents to the clinic for catheter removal.  Reason for removal: post op cath removal  Order has been verified. yes  Catheter successfully removed at 08:12 AM without immediate complication.  Clear yellow urine present in the catheter bag.  Urethral meatus is free of secretions and encrustation.  The patient is afebrile.  The patient tolerated the procedure and was instructed to follow up with their PCP or consultant as planned, return or call for pain, fever, leakage or decreased urine flow, and take one prophylactic antibiotic that was sent to pt after surgery.     This service provided today was under the supervising provider of the day Dr. Dumont, who was available if needed.    Isabel Flaherty, CMA

## 2023-08-02 DIAGNOSIS — D49.4 BLADDER NEOPLASM: Primary | ICD-10-CM

## 2023-08-02 LAB
PATH REPORT.COMMENTS IMP SPEC: ABNORMAL
PATH REPORT.COMMENTS IMP SPEC: YES
PATH REPORT.FINAL DX SPEC: ABNORMAL
PATH REPORT.GROSS SPEC: ABNORMAL
PATH REPORT.MICROSCOPIC SPEC OTHER STN: ABNORMAL
PATH REPORT.RELEVANT HX SPEC: ABNORMAL
PHOTO IMAGE: ABNORMAL

## 2023-08-03 ENCOUNTER — PATIENT OUTREACH (OUTPATIENT)
Dept: ONCOLOGY | Facility: CLINIC | Age: 73
End: 2023-08-03
Payer: MEDICARE

## 2023-08-03 DIAGNOSIS — Z11.59 ENCOUNTER FOR SCREENING FOR OTHER VIRAL DISEASES: ICD-10-CM

## 2023-08-03 DIAGNOSIS — C88.40 MALT (MUCOSA ASSOCIATED LYMPHOID TISSUE): Primary | ICD-10-CM

## 2023-08-03 NOTE — TELEPHONE ENCOUNTER
RECORDS STATUS - ALL OTHER DIAGNOSIS    Bladder neoplasm.   RECORDS RECEIVED FROM:    Appt Date: 8/7/2023 with Dr. Jordan   NOTES STATUS DETAILS   OFFICE NOTE from referring provider Complete EPIC   Saran Fairchild MD    DISCHARGE SUMMARY from hospital     DISCHARGE REPORT from the ER     OPERATIVE REPORT Complete See Biopsy in EPIC   MEDICATION LIST Complete [facility]   CLINICAL TRIAL TREATMENTS TO DATE     LABS     PATHOLOGY REPORTS See internal biopsy in [facility] 7/20/2023   Bladder, transurethral resection of tumor:     -Atypical lymphoplasmacytic infiltrate, see comment.  -Negative for urothelial dysplasia and malignancy.  -Sampling includes: Urothelium, lamina propria, and muscularis propria.   ANYTHING RELATED TO DIAGNOSIS Complete Labs last updated on 7/20/2023   GENONOMIC TESTING     TYPE:     IMAGING (NEED IMAGES & REPORT)     CT SCANS Complete CT Urogram 6/7/2023    MRI     MAMMO     ULTRASOUND     PET

## 2023-08-03 NOTE — PROGRESS NOTES
[facility]: Cancer Care                                                                   Hem/Onc  Referral reviewed   08/02/2023  1:18 PM    Referred By  Referred To   Saran Fairchild MD  [facility]   [address] [place] [address]   Phone: [phone]   Fax: [phone]    Diagnoses: Bladder neoplasm   Order: Adult Oncology/Hematology  Referral   My Clinical Question Is:lymphoplasmacytic infiltrate found on transurethral resection of bladder tumor Medical Oncology Hematology        ASSESSMENT      Clinical History (per Nurse review of records provided):    73 year old male patient with urinary bladder lesion biopsied and resulted   Gross hematuria. Bladder stone. Renal stone. Hypervascular urinary bladder lesion. Surgery note is reviewed: A 2.5 cm area of sessile erythematous bladder lesion along the left tone, resected down to muscle fibers.     Records Location: [facility]   Pertinent labs -- BOOKMARKED  Pertinent pathology report(s) -- BOOKMARKED  Pertinent imaging -- BOOKMARKED  Referring provider note(s)-- BOOKMARKED    INTERVENTION(S)                                                      -OUTGOING CALL to pt:   Introduced my role as nurse navigator with [facility] Hematology/Oncology dept and that we have recd the referral for dx of lymphoma from Dr Fairchild.  Identity verified. Pt confirms they are aware of the referral and ready to schedule  Home address: [facility] [place] / spends summer in [place] of [place] (returning to [place]  in October), has PCP there (listed on care team ) and urologist Dr Carver.     Pt prefers [place] location for consult but [facility] location for other appts when possible.  We discussed that there are many types of lymphoma and in addition to bx a complete work up usually includes labs, imaging (PET-CT), sometimes other tests in order to further characterize the stage and type of  lymphoma and guide tx.  Pt states his daughter in law who lives next door to him in [place] is a pediatric pulmonologist and told him to expect lab+PET.    Explained to pt that he will be connected to our scheduling intake team today to arrange the consult and he will receive a call from a  in the next 1-2 business days if the MD orders pre--consult testing, ie lab and PET    Provided my callback number for interval question.  Pt voiced understanding of above instructions and information and denied further questions and was   warm-transferred to Oncology Patient Access rep to schedule the consultation.    -Referral Triage Scheduling Instructions added to Hem/Onc  referral for Patient Access rep    PLAN                                                      Hem/Onc consult     Future Appointments   Date Time Provider Department Center   8/4/2023 11:00 AM [facility]   8/7/2023  3:15 PM Lashonda Jordan MD [facility]   [facility]14[facility]23  [facility]       See records team's Pre-Visit encounter documentation for additional records retrieval information.    Lillian Valenzuela, RN, BSN, OCN  Hematology/Oncology New Patient Nurse Navigator   [facility]  [phone]

## 2023-08-04 ENCOUNTER — LAB (OUTPATIENT)
Dept: LAB | Facility: CLINIC | Age: 73
End: 2023-08-04
Payer: MEDICARE

## 2023-08-04 ENCOUNTER — OFFICE VISIT (OUTPATIENT)
Dept: UROLOGY | Facility: CLINIC | Age: 73
End: 2023-08-04
Payer: MEDICARE

## 2023-08-04 VITALS
DIASTOLIC BLOOD PRESSURE: 70 MMHG | BODY MASS INDEX: 26.48 KG/M2 | SYSTOLIC BLOOD PRESSURE: 130 MMHG | HEIGHT: 70 IN | WEIGHT: 185 LBS

## 2023-08-04 DIAGNOSIS — C88.40 MALT (MUCOSA ASSOCIATED LYMPHOID TISSUE): ICD-10-CM

## 2023-08-04 DIAGNOSIS — Z79.2 PROPHYLACTIC ANTIBIOTIC: ICD-10-CM

## 2023-08-04 DIAGNOSIS — Z11.59 ENCOUNTER FOR SCREENING FOR OTHER VIRAL DISEASES: ICD-10-CM

## 2023-08-04 DIAGNOSIS — N20.0 RIGHT KIDNEY STONE: ICD-10-CM

## 2023-08-04 DIAGNOSIS — D49.4 BLADDER NEOPLASM: Primary | ICD-10-CM

## 2023-08-04 LAB
ALBUMIN SERPL BCG-MCNC: 4.5 G/DL (ref 3.5–5.2)
ALP SERPL-CCNC: 83 U/L (ref 40–129)
ALT SERPL W P-5'-P-CCNC: 29 U/L (ref 0–70)
ANION GAP SERPL CALCULATED.3IONS-SCNC: 10 MMOL/L (ref 7–15)
AST SERPL W P-5'-P-CCNC: 45 U/L (ref 0–45)
BASOPHILS # BLD AUTO: 0 10E3/UL (ref 0–0.2)
BASOPHILS NFR BLD AUTO: 0 %
BILIRUB SERPL-MCNC: 0.5 MG/DL
BUN SERPL-MCNC: 21.8 MG/DL (ref 8–23)
CALCIUM SERPL-MCNC: 9.7 MG/DL (ref 8.8–10.2)
CHLORIDE SERPL-SCNC: 104 MMOL/L (ref 98–107)
CREAT SERPL-MCNC: 1.02 MG/DL (ref 0.67–1.17)
DEPRECATED HCO3 PLAS-SCNC: 26 MMOL/L (ref 22–29)
EOSINOPHIL # BLD AUTO: 0.4 10E3/UL (ref 0–0.7)
EOSINOPHIL NFR BLD AUTO: 6 %
ERYTHROCYTE [DISTWIDTH] IN BLOOD BY AUTOMATED COUNT: 13.5 % (ref 10–15)
GFR SERPL CREATININE-BSD FRML MDRD: 78 ML/MIN/1.73M2
GLUCOSE SERPL-MCNC: 94 MG/DL (ref 70–99)
HCT VFR BLD AUTO: 44.5 % (ref 40–53)
HGB BLD-MCNC: 15.1 G/DL (ref 13.3–17.7)
IMM GRANULOCYTES # BLD: 0 10E3/UL
IMM GRANULOCYTES NFR BLD: 0 %
LDH SERPL L TO P-CCNC: 300 U/L (ref 0–250)
LYMPHOCYTES # BLD AUTO: 1.3 10E3/UL (ref 0.8–5.3)
LYMPHOCYTES NFR BLD AUTO: 18 %
MCH RBC QN AUTO: 30.1 PG (ref 26.5–33)
MCHC RBC AUTO-ENTMCNC: 33.9 G/DL (ref 31.5–36.5)
MCV RBC AUTO: 89 FL (ref 78–100)
MONOCYTES # BLD AUTO: 0.5 10E3/UL (ref 0–1.3)
MONOCYTES NFR BLD AUTO: 8 %
NEUTROPHILS # BLD AUTO: 4.6 10E3/UL (ref 1.6–8.3)
NEUTROPHILS NFR BLD AUTO: 68 %
PLATELET # BLD AUTO: 241 10E3/UL (ref 150–450)
POTASSIUM SERPL-SCNC: 5 MMOL/L (ref 3.4–5.3)
PROT SERPL-MCNC: 7.1 G/DL (ref 6.4–8.3)
RBC # BLD AUTO: 5.02 10E6/UL (ref 4.4–5.9)
SODIUM SERPL-SCNC: 140 MMOL/L (ref 136–145)
TOTAL PROTEIN SERUM FOR ELP: 6.7 G/DL (ref 6.4–8.3)
URATE SERPL-MCNC: 5.9 MG/DL (ref 3.4–7)
WBC # BLD AUTO: 6.9 10E3/UL (ref 4–11)

## 2023-08-04 PROCEDURE — 80053 COMPREHEN METABOLIC PANEL: CPT | Performed by: INTERNAL MEDICINE

## 2023-08-04 PROCEDURE — 99213 OFFICE O/P EST LOW 20 MIN: CPT | Mod: 25 | Performed by: STUDENT IN AN ORGANIZED HEALTH CARE EDUCATION/TRAINING PROGRAM

## 2023-08-04 PROCEDURE — 82232 ASSAY OF BETA-2 PROTEIN: CPT | Performed by: INTERNAL MEDICINE

## 2023-08-04 PROCEDURE — 86803 HEPATITIS C AB TEST: CPT | Performed by: INTERNAL MEDICINE

## 2023-08-04 PROCEDURE — 36415 COLL VENOUS BLD VENIPUNCTURE: CPT | Performed by: INTERNAL MEDICINE

## 2023-08-04 PROCEDURE — 86704 HEP B CORE ANTIBODY TOTAL: CPT | Performed by: INTERNAL MEDICINE

## 2023-08-04 PROCEDURE — 83615 LACTATE (LD) (LDH) ENZYME: CPT | Performed by: INTERNAL MEDICINE

## 2023-08-04 PROCEDURE — 87340 HEPATITIS B SURFACE AG IA: CPT | Performed by: INTERNAL MEDICINE

## 2023-08-04 PROCEDURE — 52310 CYSTOSCOPY AND TREATMENT: CPT | Performed by: STUDENT IN AN ORGANIZED HEALTH CARE EDUCATION/TRAINING PROGRAM

## 2023-08-04 PROCEDURE — 84155 ASSAY OF PROTEIN SERUM: CPT | Mod: 59 | Performed by: INTERNAL MEDICINE

## 2023-08-04 PROCEDURE — 84550 ASSAY OF BLOOD/URIC ACID: CPT | Performed by: INTERNAL MEDICINE

## 2023-08-04 PROCEDURE — 85025 COMPLETE CBC W/AUTO DIFF WBC: CPT | Performed by: INTERNAL MEDICINE

## 2023-08-04 PROCEDURE — 87389 HIV-1 AG W/HIV-1&-2 AB AG IA: CPT | Performed by: INTERNAL MEDICINE

## 2023-08-04 PROCEDURE — 84165 PROTEIN E-PHORESIS SERUM: CPT

## 2023-08-04 PROCEDURE — 86706 HEP B SURFACE ANTIBODY: CPT | Performed by: INTERNAL MEDICINE

## 2023-08-04 RX ORDER — LIDOCAINE HYDROCHLORIDE 20 MG/ML
JELLY TOPICAL ONCE
Status: COMPLETED | OUTPATIENT
Start: 2023-08-04 | End: 2023-08-04

## 2023-08-04 RX ORDER — CIPROFLOXACIN 500 MG/1
500 TABLET, FILM COATED ORAL ONCE
Qty: 1 TABLET | Refills: 0 | Status: SHIPPED | OUTPATIENT
Start: 2023-08-04 | End: 2023-08-04

## 2023-08-04 RX ADMIN — LIDOCAINE HYDROCHLORIDE: 20 JELLY TOPICAL at 09:52

## 2023-08-04 ASSESSMENT — PAIN SCALES - GENERAL: PAINLEVEL: NO PAIN (0)

## 2023-08-04 NOTE — PROGRESS NOTES
"CHIEF COMPLAINT   Filemon Leon who is a 73 year old male returns today for follow-up of h/o elevated PSA w/ negative biopsy 2021, bladder tumor, bladder stone, right renal stones s/p TURBT, bladder stone extraction and right URS with stent placement 7/20/2023, found to have atypical lymphoplasmacytic infiltrate on bladder pathology    HPI   Filemon Leon is a 73 year old male returns today for follow-up of h/o elevated PSA w/ negative biopsy 2021, bladder tumor, bladder stone, right renal stones s/p TURBT, bladder stone extraction and right URS with stent placement 7/20/2023, found to have atypical lymphoplasmacytic infiltrate on bladder pathology    PHYSICAL EXAM  Patient is a 73 year old  male   Vitals: Blood pressure 130/70, height 1.778 m (5' 10\"), weight 83.9 kg (185 lb).  Body mass index is 26.54 kg/m .  General Appearance Adult:   Alert, no acute distress, oriented  HENT: throat/mouth:normal, good dentition  Lungs: no respiratory distress, or pursed lip breathing  Heart: No obvious jugular venous distension present  Abdomen: nondistended  Musculoskeltal: extremities normal, no peripheral edema  Skin: no suspicious lesions or rashes  Neuro: Alert, oriented, speech and mentation normal  Psych: affect and mood normal  Gait: Normal  : circ phallus    Stone analysis 7/20/2023 right kidney  Comment: Calculi composed primarily of  calcium oxalate monohydrate     Stone analysis 7/20/2023 bladder stone  Calculi composed primarily of:   50% calcium oxalate monohydrate, and   50% calcium oxalate dihydrate.       Surgical pathology 7/20/2023    Final Diagnosis   Bladder, transurethral resection of tumor:     -Atypical lymphoplasmacytic infiltrate, see comment.  -Negative for urothelial dysplasia and malignancy.  -Sampling includes: Urothelium, lamina propria, and muscularis propria.     PRE-PROCEDURE DIAGNOSIS: right renal stone    POST-PROCEDURE DIAGNOSIS: right renal stone    PROCEDURE: Cystoscopy with right " ureteral stent removal    DESCRIPTION OF PROCEDURE: After informed consent was obtained, the patient was brought to the procedure room where he was placed in the supine position with all pressure points well padded.  The penis was prepped and draped in sterile fashion. A flexible cystoscope was introduced through a well-lubricated urethra. The stent was visualized, grasped with a flexible grasper and removed intact and discarded    The flexible cystoscope was removed and the findings were described to the patient.       ASSESSMENT and PLAN  73 year old male returns today for follow-up of h/o elevated PSA w/ negative biopsy 2021, bladder tumor, bladder stone, right renal stones s/p TURBT, bladder stone extraction and right URS with stent placement 7/20/2023, found to have atypical lymphoplasmacytic infiltrate on bladder pathology    Stent was removed today    For bladder path he has upcoming blood labs, onc consult and PET scan coming up    We discussed caox stone prevention diet. He declines further metabolic workup for the stones at this time. He is leaving for [place] in October and he has a urologist established there. He should probably have a surveillance cystoscopy in about 3 months from now to recheck the bladder and see if there is any recurrent tumor    - see oncology as scheduled  - follow up with me when he returns from [place] with PSA, CT abd/pelvis w/o contrast (if he has not had labs or imaging done elsewhere). If he has a CT done by oncology then doesn't need repeat imaging with urology    Saran Fairchild MD   [facility] Urology  [facility] Phone: [phone]

## 2023-08-04 NOTE — LETTER
"8/4/2023       RE: Filemon Leon  [address]  [address]  [facility] [address]     Dear Colleague,    Thank you for referring your patient, Filemon Leon, to the [facility] UROLOGY CLINIC [place] at [facility]. Please see a copy of my visit note below.    CHIEF COMPLAINT   Filemon Leon who is a 73 year old male returns today for follow-up of h/o elevated PSA w/ negative biopsy 2021, bladder tumor, bladder stone, right renal stones s/p TURBT, bladder stone extraction and right URS with stent placement 7/20/2023, found to have atypical lymphoplasmacytic infiltrate on bladder pathology    HPI   Filemon Leon is a 73 year old male returns today for follow-up of h/o elevated PSA w/ negative biopsy 2021, bladder tumor, bladder stone, right renal stones s/p TURBT, bladder stone extraction and right URS with stent placement 7/20/2023, found to have atypical lymphoplasmacytic infiltrate on bladder pathology    PHYSICAL EXAM  Patient is a 73 year old  male   Vitals: Blood pressure 130/70, height 1.778 m (5' 10\"), weight 83.9 kg (185 lb).  Body mass index is 26.54 kg/m .  General Appearance Adult:   Alert, no acute distress, oriented  HENT: throat/mouth:normal, good dentition  Lungs: no respiratory distress, or pursed lip breathing  Heart: No obvious jugular venous distension present  Abdomen: nondistended  Musculoskeltal: extremities normal, no peripheral edema  Skin: no suspicious lesions or rashes  Neuro: Alert, oriented, speech and mentation normal  Psych: affect and mood normal  Gait: Normal  : circ phallus    Stone analysis 7/20/2023 right kidney  Comment: Calculi composed primarily of  calcium oxalate monohydrate     Stone analysis 7/20/2023 bladder stone  Calculi composed primarily of:   50% calcium oxalate monohydrate, and   50% calcium oxalate dihydrate.     PRE-PROCEDURE DIAGNOSIS: right renal stone    POST-PROCEDURE DIAGNOSIS: right " renal stone    PROCEDURE: Cystoscopy with right ureteral stent removal    DESCRIPTION OF PROCEDURE: After informed consent was obtained, the patient was brought to the procedure room where he was placed in the supine position with all pressure points well padded.  The penis was prepped and draped in sterile fashion. A flexible cystoscope was introduced through a well-lubricated urethra. The stent was visualized, grasped with a flexible grasper and removed intact and discarded    The flexible cystoscope was removed and the findings were described to the patient.       ASSESSMENT and PLAN  73 year old male returns today for follow-up of h/o elevated PSA w/ negative biopsy 2021, bladder tumor, bladder stone, right renal stones s/p TURBT, bladder stone extraction and right URS with stent placement 7/20/2023, found to have atypical lymphoplasmacytic infiltrate on bladder pathology    Stent was removed today    For bladder path he has upcoming blood labs, onc consult and PET scan coming up    We discussed caox stone prevention diet. He declines further metabolic workup for the stones at this time. He is leaving for [place] in October and he has a urologist established there. He should probably have a surveillance cystoscopy in about 3 months from now to recheck the bladder and see if there is any recurrent tumor    - see oncology as scheduled  - follow up with me when he returns from [place] with PSA, CT abd/pelvis w/o contrast (if he has not had labs or imaging done elsewhere). If he has a CT done by oncology then doesn't need repeat imaging with urology    Saran Fairchild MD   [facility] Urology  [facility] Phone: [phone]

## 2023-08-04 NOTE — NURSING NOTE
Chief Complaint   Patient presents with    Bladder tumor     Stent removal     Prior to the start of the procedure and with procedural staff participation, I verbally confirmed the patient s identity using two indicators, relevant allergies, that the procedure was appropriate and matched the consent or emergent situation, and that the correct equipment/implants were available. Immediately prior to starting the procedure I conducted the Time Out with the procedural staff and re-confirmed the patient s name, procedure, and site/side. I have wiped the patient off with the povidone-Iodine solution, draped them, and used Lidocaine hydrochloride jelly. (The Joint Commission universal protocol was followed.)  Yes    Sedation (Moderate or Deep): None    5mL 2% lidocaine hydrochloride Urojet instilled into urethra.    NDC# [address]-4  Lot #: [id number]  Expiration Date:  12/24    Nohemi Howell EMT

## 2023-08-04 NOTE — PATIENT INSTRUCTIONS
"AFTER YOUR CYSTOSCOPY AND STENT REMOVAL  ?  ?  You have just completed a cystoscopy, or \"cysto\", which allowed your physician to learn more about your bladder (or to remove a stent placed after surgery). We suggest that you continue to avoid caffeine, fruit juice, and alcohol for the next 24 hours, however, you are encouraged to return to your normal activities.  ?  ?  A few things that are considered normal after your cystoscopy:  ?  * small amount of bleeding (or spotting) that clears within the next 24 hours  ?  * slight burning sensation with urination  ?  * sensation of needing to void (urinate) more frequently  ?  * the feeling of \"air\" in your urine  ?  * mild discomfort that is relieved with Tylenol    * bladder spasms  ?  ?  ?  Please contact our office promptly if you:  ?  * develop a fever above 101 degrees  ?  * are unable to urinate  ?  * develop bright red blood that does not stop  ?  * experience severe pain or swelling  ?  ?  ?  And of course, please contact our office with any concerns or questions [phone].  ?    "

## 2023-08-05 LAB
HBV CORE AB SERPL QL IA: NONREACTIVE
HBV SURFACE AB SERPL IA-ACNC: 0.36 M[IU]/ML
HBV SURFACE AB SERPL IA-ACNC: NONREACTIVE M[IU]/ML
HBV SURFACE AG SERPL QL IA: NONREACTIVE
HCV AB SERPL QL IA: NONREACTIVE
HIV 1+2 AB+HIV1 P24 AG SERPL QL IA: NONREACTIVE

## 2023-08-07 ENCOUNTER — PRE VISIT (OUTPATIENT)
Dept: ONCOLOGY | Facility: CLINIC | Age: 73
End: 2023-08-07
Payer: MEDICARE

## 2023-08-07 ENCOUNTER — PATIENT OUTREACH (OUTPATIENT)
Dept: ONCOLOGY | Facility: CLINIC | Age: 73
End: 2023-08-07
Payer: MEDICARE

## 2023-08-07 ENCOUNTER — ONCOLOGY VISIT (OUTPATIENT)
Dept: ONCOLOGY | Facility: CLINIC | Age: 73
End: 2023-08-07
Attending: STUDENT IN AN ORGANIZED HEALTH CARE EDUCATION/TRAINING PROGRAM
Payer: MEDICARE

## 2023-08-07 VITALS
RESPIRATION RATE: 16 BRPM | WEIGHT: 182.9 LBS | BODY MASS INDEX: 27.09 KG/M2 | TEMPERATURE: 98 F | HEIGHT: 69 IN | OXYGEN SATURATION: 98 % | SYSTOLIC BLOOD PRESSURE: 119 MMHG | DIASTOLIC BLOOD PRESSURE: 72 MMHG | HEART RATE: 61 BPM

## 2023-08-07 DIAGNOSIS — D49.4 BLADDER NEOPLASM: ICD-10-CM

## 2023-08-07 LAB
ALBUMIN SERPL ELPH-MCNC: 4.1 G/DL (ref 3.7–5.1)
ALPHA1 GLOB SERPL ELPH-MCNC: 0.3 G/DL (ref 0.2–0.4)
ALPHA2 GLOB SERPL ELPH-MCNC: 0.7 G/DL (ref 0.5–0.9)
B-GLOBULIN SERPL ELPH-MCNC: 0.8 G/DL (ref 0.6–1)
B2 MICROGLOB TUMOR MARKER SER-MCNC: 1.7 MG/L
GAMMA GLOB SERPL ELPH-MCNC: 0.8 G/DL (ref 0.7–1.6)
INTERPRETATION: NORMAL
LAB DIRECTOR COMMENTS: NORMAL
LAB DIRECTOR DISCLAIMER: NORMAL
LAB DIRECTOR INTERPRETATION: NORMAL
LAB DIRECTOR METHODOLOGY: NORMAL
LAB DIRECTOR RESULTS: NORMAL
M PROTEIN SERPL ELPH-MCNC: 0 G/DL
PROT PATTERN SERPL ELPH-IMP: NORMAL
SIGNIFICANT RESULTS: NORMAL
SPECIMEN DESCRIPTION: NORMAL
SPECIMEN DESCRIPTION: NORMAL
TEST DETAILS, MDL: NORMAL

## 2023-08-07 PROCEDURE — 81305 MYD88 GENE P.LEU265PRO VRNT: CPT | Performed by: STUDENT IN AN ORGANIZED HEALTH CARE EDUCATION/TRAINING PROGRAM

## 2023-08-07 PROCEDURE — 99205 OFFICE O/P NEW HI 60 MIN: CPT | Performed by: STUDENT IN AN ORGANIZED HEALTH CARE EDUCATION/TRAINING PROGRAM

## 2023-08-07 PROCEDURE — G0463 HOSPITAL OUTPT CLINIC VISIT: HCPCS | Performed by: STUDENT IN AN ORGANIZED HEALTH CARE EDUCATION/TRAINING PROGRAM

## 2023-08-07 ASSESSMENT — PAIN SCALES - GENERAL: PAINLEVEL: NO PAIN (0)

## 2023-08-07 NOTE — LETTER
"    8/7/2023         RE: Filemon Leon  [address]  [address]  [place] [address]        Dear Colleague,    Thank you for referring your patient, Filemon Leon, to the [facility] CANCER [facility]INIC. Please see a copy of my visit note below.    Subjective   Filemon is a 73 year old, presenting for the following health issues:  Oncology Clinic Visit (Bladder neoplasm)    HPI     Oncology History Overview Note   This is a 73-year-old male with history of coronary artery disease, gout, kidney stones, hypothyroidism comes in with newly diagnosed bladder mucosa-associated lymphoid tissue lymphoma.    He has several years history of kidney stones.  Initially there was incidentally found on CT scan 2 to 4 mm in size.  He has been following up with urology in [place].  Last winter, he had a few episodes of hematuria.  That led to neurologic work-up.  A CT scan was done which showed growing kidney stones. On a routine cystoscopy he was found to have a bladder stone.  He underwent cystoscopy, right ureteroscopy with thulium fiber laser lithotripsy right retrograde pyelogram, right ureteral stent placement, bladder stone removal.  On cystoscopy about 2.5 cm sessile bladder tumor was found.  Which was resected until muscle layer for concern of bladder cancer.    Pathology of bladder mass showed atypical lymphoplasmacytic infiltrates with lambda restricted plasma cells suggestive of mucosa-associated lymphoid tissue lymphoma.  BCGR MYD88 is pending.       Patient comes today for follow up. No fevers, chills, night sweats or weight loss, no lymphadenopathy. No pain.    Review of Systems   8 point review of systems was negative except pertinent positives listed above.       Objective    /72 (BP Location: Left arm, Patient Position: Sitting, Cuff Size: Adult Regular)   Pulse 61   Temp 98  F (36.7  C) (Oral)   Resp 16   Ht 1.753 m (5' 9\")   Wt 83 kg (182 lb 14.4 oz)   SpO2 98%   BMI 27.01 " kg/m    Body mass index is 27.01 kg/m .  Physical Exam   GENERAL:  Alert oriented well nourished  HEAD: normocephalic atraumatic  SKIN:  no rash, hives, other lesions.  EYE: anicteric Sclerae  ENT: no throat erythema, enlarged tonsills, no ulcers or mucositis in the mouth  LYMPHATIC: no abnormal lymph nodes palable in cervical, axillary, supraclavicular or inguinal area.  RESP:  No rales or rhonchi, breath sounds bilaterally equal and vesicular  CV:  No tachycardia, S1 S2 normal No murmur.  GI:  Abdomen soft nontender no hepato or splenomegaly  MUSCULOSKELETAL:  No visible joint redness or swelling.  NEURO:  No gross weakness gait normal  PSYCH: pleasant affect    Labs:Labs reviewed. No cytopenias, renal or liver abnormalities, LDH elevated.      Assessment and plan:    1) bladder mucosa-associated lymphoid tissue lymphoma:  -I went over history of mucosa-associated lymphoid tissue lymphoma in detail with the patient.  I explained slow growing nature of disease, indolent course, asymptomatic nature, incurable nature.  I reiterated that relapse of lymphoma cannot be completely ruled out but in general this lymphoma does not affect your life expectancy in most cases.  It is also very treatable illness with current treatment modalities.  -I will start with a PET scan to look for disease burden.  If there is no hypermetabolic site identified on the PET scan, I might elect to continue expectant management.  -If additional foci of disease are found, I might pursue biopsy to confirm the diagnosis and depending on the results I might treatment with rituximab.  Blood and radiation will not be an option because of toxicities  -I discussed the scenarios with the patient.  He understands PET scan and further biopsies if needed.    I would see him in the clinic in 2 weeks.    2) elevated LDH:  -Patient informs me that he had labs on the same day as he was going for kidney stent removal.  Elevated LDH is likely related to  inflammation incited by recent procedure.  I doubt this is reflective of his tumor burden but we will follow-up with the PET scan.    Total time spent on date of service in review of medical records, review of labs, history taking, physical exam, discussion of assessment and plan, counseling and patient education is 60 minutes.    Lashonda Jordan MD  Attending Physician  Pager [phone]

## 2023-08-07 NOTE — NURSING NOTE
"Oncology Rooming Note    August 7, 2023 3:16 PM   Filemon Leon is a 73 year old male who presents for:    Chief Complaint   Patient presents with    Oncology Clinic Visit     Bladder neoplasm     Initial Vitals: /72 (BP Location: Left arm, Patient Position: Sitting, Cuff Size: Adult Regular)   Pulse 61   Temp 98  F (36.7  C) (Oral)   Resp 16   Ht 1.753 m (5' 9\")   Wt 83 kg (182 lb 14.4 oz)   SpO2 98%   BMI 27.01 kg/m   Estimated body mass index is 27.01 kg/m  as calculated from the following:    Height as of this encounter: 1.753 m (5' 9\").    Weight as of this encounter: 83 kg (182 lb 14.4 oz). Body surface area is 2.01 meters squared.  No Pain (0) Comment: Data Unavailable   No LMP for male patient.  Allergies reviewed: Yes  Medications reviewed: Yes    Medications: Medication refills not needed today.  Pharmacy name entered into [facility]:    [facility][id number] - [place], [place] - [address]  AT [facility] [address]  [id number] - [place], [place] - [address] [place] [facility] AT [facility] & [facility] [place] [place] [facility] [address] [facility]    Clinical concerns: New patient consult for Bladder neoplasm.        Edgardo Brennan              "

## 2023-08-07 NOTE — PROGRESS NOTES
[facility]: Cancer Care Initial Note                                    Discussion with Patient:                                                      Met with Filemon after office visit with Dr. Jordan. Introduced self as RN Care Coordinator. Provided [facility] Guidebook folder and discussed included materials with patient. Distributed business cards and contact information for [facility]. Discussed roles of RNCC, MD, FRANNIE, nurse triage line, and clinic coordinators. Discussed how to get in contact with different team members via [employer] for non emergency questions and at [phone], which has options to talk with a Nurse available 24/7. Provided overview of supportive services at [facility] including SW, Cancer Rehab, Cancer Survivorship, oncology dietitian, and oncology behavioral health providers (psychology, psychiatry, counseling).     Auth to Discuss PHI form completed- form placed in scanning.         Assessment:                                                      Initial  Current living arrangement:: I live in a private home with family  Informal Support system:: Family;Friends  Bed or wheelchair confined:: No  Mobility Status: Independent  Transportation means:: Family;Regular car        Intervention/Education provided during outreach:                                                       Further POC:     - schedule follow-up with Dr. Jordan in 2 weeks     Patient verbalizes understanding and has no questions or concerns at this time. Has contact information for [facility] if they have any further needs.    MAYELIN Gan, RN  RN Care Coordinator   [facility] [address] [address]   [phone]

## 2023-08-08 PROCEDURE — 81261 IGH GENE REARRANGE AMP METH: CPT | Performed by: STUDENT IN AN ORGANIZED HEALTH CARE EDUCATION/TRAINING PROGRAM

## 2023-08-08 PROCEDURE — G0452 MOLECULAR PATHOLOGY INTERPR: HCPCS | Mod: 26 | Performed by: STUDENT IN AN ORGANIZED HEALTH CARE EDUCATION/TRAINING PROGRAM

## 2023-08-08 NOTE — PROGRESS NOTES
"Jose Martin Colbert is a 73 year old, presenting for the following health issues:  Oncology Clinic Visit (Bladder neoplasm)    HPI     Oncology History Overview Note   This is a 73-year-old male with history of coronary artery disease, gout, kidney stones, hypothyroidism comes in with newly diagnosed bladder mucosa-associated lymphoid tissue lymphoma.    He has several years history of kidney stones.  Initially there was incidentally found on CT scan 2 to 4 mm in size.  He has been following up with urology in [place].  Last winter, he had a few episodes of hematuria.  That led to neurologic work-up.  A CT scan was done which showed growing kidney stones. On a routine cystoscopy he was found to have a bladder stone.  He underwent cystoscopy, right ureteroscopy with thulium fiber laser lithotripsy right retrograde pyelogram, right ureteral stent placement, bladder stone removal.  On cystoscopy about 2.5 cm sessile bladder tumor was found.  Which was resected until muscle layer for concern of bladder cancer.    Pathology of bladder mass showed atypical lymphoplasmacytic infiltrates with lambda restricted plasma cells suggestive of mucosa-associated lymphoid tissue lymphoma.  BCGR MYD88 is pending.       Patient comes today for follow up. No fevers, chills, night sweats or weight loss, no lymphadenopathy. No pain.    Review of Systems   8 point review of systems was negative except pertinent positives listed above.        Objective    /72 (BP Location: Left arm, Patient Position: Sitting, Cuff Size: Adult Regular)   Pulse 61   Temp 98  F (36.7  C) (Oral)   Resp 16   Ht 1.753 m (5' 9\")   Wt 83 kg (182 lb 14.4 oz)   SpO2 98%   BMI 27.01 kg/m    Body mass index is 27.01 kg/m .  Physical Exam   GENERAL:  Alert oriented well nourished  HEAD: normocephalic atraumatic  SKIN:  no rash, hives, other lesions.  EYE: anicteric Sclerae  ENT: no throat erythema, enlarged tonsills, no ulcers or mucositis in the " mouth  LYMPHATIC: no abnormal lymph nodes palable in cervical, axillary, supraclavicular or inguinal area.  RESP:  No rales or rhonchi, breath sounds bilaterally equal and vesicular  CV:  No tachycardia, S1 S2 normal No murmur.  GI:  Abdomen soft nontender no hepato or splenomegaly  MUSCULOSKELETAL:  No visible joint redness or swelling.  NEURO:  No gross weakness gait normal  PSYCH: pleasant affect    Labs:Labs reviewed. No cytopenias, renal or liver abnormalities, LDH elevated.      Assessment and plan:    1) bladder mucosa-associated lymphoid tissue lymphoma:  -I went over history of mucosa-associated lymphoid tissue lymphoma in detail with the patient.  I explained slow growing nature of disease, indolent course, asymptomatic nature, incurable nature.  I reiterated that relapse of lymphoma cannot be completely ruled out but in general this lymphoma does not affect your life expectancy in most cases.  It is also very treatable illness with current treatment modalities.  -I will start with a PET scan to look for disease burden.  If there is no hypermetabolic site identified on the PET scan, I might elect to continue expectant management.  -If additional foci of disease are found, I might pursue biopsy to confirm the diagnosis and depending on the results I might treatment with rituximab.  Blood and radiation will not be an option because of toxicities  -I discussed the scenarios with the patient.  He understands PET scan and further biopsies if needed.    I would see him in the clinic in 2 weeks.    2) elevated LDH:  -Patient informs me that he had labs on the same day as he was going for kidney stent removal.  Elevated LDH is likely related to inflammation incited by recent procedure.  I doubt this is reflective of his tumor burden but we will follow-up with the PET scan.    Total time spent on date of service in review of medical records, review of labs, history taking, physical exam, discussion of assessment  and plan, counseling and patient education is 60 minutes.    Lashonda Jordan MD  Attending Physician  Pager [phone]

## 2023-08-14 ENCOUNTER — HOSPITAL ENCOUNTER (OUTPATIENT)
Dept: PET IMAGING | Facility: CLINIC | Age: 73
Discharge: HOME | End: 2023-08-14
Attending: STUDENT IN AN ORGANIZED HEALTH CARE EDUCATION/TRAINING PROGRAM
Payer: MEDICARE

## 2023-08-14 DIAGNOSIS — C88.40 MALT (MUCOSA ASSOCIATED LYMPHOID TISSUE): ICD-10-CM

## 2023-08-14 PROCEDURE — 74177 CT ABD & PELVIS W/CONTRAST: CPT

## 2023-08-14 PROCEDURE — A9552 F18 FDG: HCPCS | Performed by: STUDENT IN AN ORGANIZED HEALTH CARE EDUCATION/TRAINING PROGRAM

## 2023-08-14 PROCEDURE — 250N000011 HC RX IP 250 OP 636: Mod: JZ | Performed by: STUDENT IN AN ORGANIZED HEALTH CARE EDUCATION/TRAINING PROGRAM

## 2023-08-14 PROCEDURE — 70491 CT SOFT TISSUE NECK W/DYE: CPT

## 2023-08-14 PROCEDURE — G1010 CDSM STANSON: HCPCS | Mod: PI

## 2023-08-14 PROCEDURE — 343N000001 HC RX 343: Performed by: STUDENT IN AN ORGANIZED HEALTH CARE EDUCATION/TRAINING PROGRAM

## 2023-08-14 RX ORDER — IOPAMIDOL 755 MG/ML
0-135 INJECTION, SOLUTION INTRAVASCULAR ONCE
Status: COMPLETED | OUTPATIENT
Start: 2023-08-14 | End: 2023-08-14

## 2023-08-14 RX ADMIN — FLUDEOXYGLUCOSE F-18 12.6 MILLICURIE: 500 INJECTION, SOLUTION INTRAVENOUS at 12:53

## 2023-08-14 RX ADMIN — IOPAMIDOL 100 ML: 755 INJECTION, SOLUTION INTRAVENOUS at 13:55

## 2023-08-17 ENCOUNTER — ONCOLOGY VISIT (OUTPATIENT)
Dept: ONCOLOGY | Facility: CLINIC | Age: 73
End: 2023-08-17
Attending: STUDENT IN AN ORGANIZED HEALTH CARE EDUCATION/TRAINING PROGRAM
Payer: MEDICARE

## 2023-08-17 VITALS
RESPIRATION RATE: 16 BRPM | SYSTOLIC BLOOD PRESSURE: 132 MMHG | HEART RATE: 59 BPM | OXYGEN SATURATION: 98 % | WEIGHT: 184.7 LBS | DIASTOLIC BLOOD PRESSURE: 78 MMHG | TEMPERATURE: 98 F | BODY MASS INDEX: 27.28 KG/M2

## 2023-08-17 DIAGNOSIS — C88.40 MALT (MUCOSA ASSOCIATED LYMPHOID TISSUE): Primary | ICD-10-CM

## 2023-08-17 PROCEDURE — 99214 OFFICE O/P EST MOD 30 MIN: CPT | Performed by: STUDENT IN AN ORGANIZED HEALTH CARE EDUCATION/TRAINING PROGRAM

## 2023-08-17 PROCEDURE — G0463 HOSPITAL OUTPT CLINIC VISIT: HCPCS | Performed by: STUDENT IN AN ORGANIZED HEALTH CARE EDUCATION/TRAINING PROGRAM

## 2023-08-17 ASSESSMENT — PAIN SCALES - GENERAL: PAINLEVEL: NO PAIN (0)

## 2023-08-17 NOTE — LETTER
8/17/2023         RE: Filemon Leon  [address]  [address]  [place] [address]        Dear Colleague,    Thank you for referring your patient, Filemon Leon, to the [facility] CANCER CLINIC. Please see a copy of my visit note below.        Subjective  Filemon is a 73 year old, presenting for the following health issues:  Oncology Clinic Visit (MALT)    HPI     Oncology History Overview Note   This is a 73-year-old male with history of coronary artery disease, gout, kidney stones, hypothyroidism comes in with newly diagnosed bladder mucosa-associated lymphoid tissue lymphoma.    He has several years history of kidney stones.  Initially there was incidentally found on CT scan 2 to 4 mm in size.  He has been following up with urology in [place].  Last winter, he had a few episodes of hematuria.  That led to neurologic work-up.  A CT scan was done which showed growing kidney stones. On a routine cystoscopy he was found to have a bladder stone.  He underwent cystoscopy, right ureteroscopy with thulium fiber laser lithotripsy right retrograde pyelogram, right ureteral stent placement, bladder stone removal.  On cystoscopy about 2.5 cm sessile bladder tumor was found.  Which was resected until muscle layer for concern of bladder cancer.    Pathology of bladder mass showed atypical lymphoplasmacytic infiltrates with lambda restricted plasma cells suggestive of mucosa-associated lymphoid tissue lymphoma.  BCGR is negative and so is MYD88 mutation. BCGR is sensitive to detect clonal B cell process in about 88% of cases, rendering 12% clonal processes undetectable.     PET scan does not show any hypermetabolic lesion concerning for lymphoma.       Patient comes today for follow up. No fevers, chills, night sweats or weight loss, no lymphadenopathy. No pain.        Review of Systems   8 point review of systems was negative except pertinent positives listed above.        Objective   /78  (BP Location: Left arm, Patient Position: Sitting, Cuff Size: Adult Regular)   Pulse 59   Temp 98  F (36.7  C) (Oral)   Resp 16   Wt 83.8 kg (184 lb 11.2 oz)   SpO2 98%   BMI 27.28 kg/m    Body mass index is 27.28 kg/m .  Physical Exam   GENERAL:  Alert oriented well nourished  HEAD: normocephalic atraumatic  SKIN:  no rash, hives, other lesions.  EYE: anicteric sclerae  ENT: no throat erythema, enlarged tonsills, no ulcers or mucositis in the mouth  LYMPHATIC: no abnormal lymph nodes palable in cervical, axillary, supraclavicular or inguinal area.  RESP:  No rales or rhonchi, breath sounds bilaterally equal and vesicular  CV:  No tachycardia, S1 S2 normal No murmur.  GI:  Abdomen soft nontender no hepato or splenomegaly  MUSCULOSKELETAL:  No visible joint redness or swelling.  NEURO:  No gross weakness gait normal  PSYCH: pleasant affect    Imaging: PETCT chest/abdomen/pelvis reviewed, no concern for progression/recurrence of lymphoma    Assessment and Plan:    1) Bladder mucosa associated lymphoid tissue lymphoma:  - He does not have any evidence of disease on PETCT scan.  - I discussed with him that bladder MALT is a slow growing lymphoma. Local treatment such as resection, radiation can lead to durable responses. Since all his tumor seems to have been resected at this point, I would elect to continue expectant management.   - For first 2 years I would recommend physical exam and labs every 3 months and CT scan every 6 months for first 2 years.  - Possible irritation from bladder stone could be responsible for MALT but its not possible to say definitively.  - It seems to me from the notes that extensive resection of bladder tumor was conducted. I will reach out to the Urologist to confirm if complete resection was done.   - If tumor is completely resected, adjuvant rituximab does not have significant role, We can manage expectantly. If there is a concern for residual tumor, re-resection is definitely and  option, or rituximab can be employed.     Total time spent on date of service in review of medical records, review of labs, history taking, physical exam, discussion of assessment and plan, counseling and patient education is 30 minutes.    Lashonda Jordan MD  Attending Physician  Pager [phone]

## 2023-08-17 NOTE — NURSING NOTE
"Oncology Rooming Note    August 17, 2023 2:58 PM   Filemon Leon is a 73 year old male who presents for:    Chief Complaint   Patient presents with    Oncology Clinic Visit     MALT     Initial Vitals: /78 (BP Location: Left arm, Patient Position: Sitting, Cuff Size: Adult Regular)   Pulse 59   Temp 98  F (36.7  C) (Oral)   Resp 16   Wt 83.8 kg (184 lb 11.2 oz)   SpO2 98%   BMI 27.28 kg/m   Estimated body mass index is 27.28 kg/m  as calculated from the following:    Height as of 8/7/23: 1.753 m (5' 9\").    Weight as of this encounter: 83.8 kg (184 lb 11.2 oz). Body surface area is 2.02 meters squared.  No Pain (0) Comment: Data Unavailable   No LMP for male patient.  Allergies reviewed: Yes  Medications reviewed: Yes    Medications: Medication refills not needed today.  Pharmacy name entered into [facility]:    [facility][id number] - [place], [place] - [address] AT [facility] [address]  [facility] #[id number] - [place], [place] - [address] AT [facility] [address] & [facility] - [place], [place] - [address]    Clinical concerns:       Rene Art              "

## 2023-08-18 NOTE — PROGRESS NOTES
Jose Martin Colbert is a 73 year old, presenting for the following health issues:  Oncology Clinic Visit (MALT)    HPI     Oncology History Overview Note   This is a 73-year-old male with history of coronary artery disease, gout, kidney stones, hypothyroidism comes in with newly diagnosed bladder mucosa-associated lymphoid tissue lymphoma.    He has several years history of kidney stones.  Initially there was incidentally found on CT scan 2 to 4 mm in size.  He has been following up with urology in [place].  Last winter, he had a few episodes of hematuria.  That led to neurologic work-up.  A CT scan was done which showed growing kidney stones. On a routine cystoscopy he was found to have a bladder stone.  He underwent cystoscopy, right ureteroscopy with thulium fiber laser lithotripsy right retrograde pyelogram, right ureteral stent placement, bladder stone removal.  On cystoscopy about 2.5 cm sessile bladder tumor was found.  Which was resected until muscle layer for concern of bladder cancer.    Pathology of bladder mass showed atypical lymphoplasmacytic infiltrates with lambda restricted plasma cells suggestive of mucosa-associated lymphoid tissue lymphoma.  BCGR is negative and so is MYD88 mutation. BCGR is sensitive to detect clonal B cell process in about 88% of cases, rendering 12% clonal processes undetectable.     PET scan does not show any hypermetabolic lesion concerning for lymphoma.       Patient comes today for follow up. No fevers, chills, night sweats or weight loss, no lymphadenopathy. No pain.        Review of Systems   8 point review of systems was negative except pertinent positives listed above.        Objective    /78 (BP Location: Left arm, Patient Position: Sitting, Cuff Size: Adult Regular)   Pulse 59   Temp 98  F (36.7  C) (Oral)   Resp 16   Wt 83.8 kg (184 lb 11.2 oz)   SpO2 98%   BMI 27.28 kg/m    Body mass index is 27.28 kg/m .  Physical Exam   GENERAL:  Alert oriented  well nourished  HEAD: normocephalic atraumatic  SKIN:  no rash, hives, other lesions.  EYE: anicteric sclerae  ENT: no throat erythema, enlarged tonsills, no ulcers or mucositis in the mouth  LYMPHATIC: no abnormal lymph nodes palable in cervical, axillary, supraclavicular or inguinal area.  RESP:  No rales or rhonchi, breath sounds bilaterally equal and vesicular  CV:  No tachycardia, S1 S2 normal No murmur.  GI:  Abdomen soft nontender no hepato or splenomegaly  MUSCULOSKELETAL:  No visible joint redness or swelling.  NEURO:  No gross weakness gait normal  PSYCH: pleasant affect    Imaging: PETCT chest/abdomen/pelvis reviewed, no concern for progression/recurrence of lymphoma    Assessment and Plan:    1) Bladder mucosa associated lymphoid tissue lymphoma:  - He does not have any evidence of disease on PETCT scan.  - I discussed with him that bladder MALT is a slow growing lymphoma. Local treatment such as resection, radiation can lead to durable responses. Since all his tumor seems to have been resected at this point, I would elect to continue expectant management.   - For first 2 years I would recommend physical exam and labs every 3 months and CT scan every 6 months for first 2 years.  - Possible irritation from bladder stone could be responsible for MALT but its not possible to say definitively.  - It seems to me from the notes that extensive resection of bladder tumor was conducted. I will reach out to the Urologist to confirm if complete resection was done.   - If tumor is completely resected, adjuvant rituximab does not have significant role, We can manage expectantly. If there is a concern for residual tumor, re-resection is definitely and option, or rituximab can be employed.     Total time spent on date of service in review of medical records, review of labs, history taking, physical exam, discussion of assessment and plan, counseling and patient education is 30 minutes.    Lashonda Jordan MD  Attending  Physician  Pager [phone]

## 2024-01-22 ENCOUNTER — APPOINTMENT (RX ONLY)
Dept: URBAN - METROPOLITAN AREA CLINIC 116 | Facility: CLINIC | Age: 74
Setting detail: DERMATOLOGY
End: 2024-01-22

## 2024-01-22 DIAGNOSIS — L81.4 OTHER MELANIN HYPERPIGMENTATION: ICD-10-CM

## 2024-01-22 DIAGNOSIS — Z85.828 PERSONAL HISTORY OF OTHER MALIGNANT NEOPLASM OF SKIN: ICD-10-CM

## 2024-01-22 DIAGNOSIS — L57.0 ACTINIC KERATOSIS: ICD-10-CM

## 2024-01-22 DIAGNOSIS — L82.1 OTHER SEBORRHEIC KERATOSIS: ICD-10-CM

## 2024-01-22 DIAGNOSIS — D18.0 HEMANGIOMA: ICD-10-CM

## 2024-01-22 PROBLEM — D18.01 HEMANGIOMA OF SKIN AND SUBCUTANEOUS TISSUE: Status: ACTIVE | Noted: 2024-01-22

## 2024-01-22 PROCEDURE — ? LIQUID NITROGEN

## 2024-01-22 PROCEDURE — ? SUNSCREEN RECOMMENDATIONS

## 2024-01-22 PROCEDURE — ? COUNSELING

## 2024-01-22 PROCEDURE — 99213 OFFICE O/P EST LOW 20 MIN: CPT | Mod: 25

## 2024-01-22 PROCEDURE — 17003 DESTRUCT PREMALG LES 2-14: CPT

## 2024-01-22 PROCEDURE — 17000 DESTRUCT PREMALG LESION: CPT

## 2024-01-22 ASSESSMENT — LOCATION DETAILED DESCRIPTION DERM
LOCATION DETAILED: LEFT MID PREAURICULAR CHEEK
LOCATION DETAILED: PERIUMBILICAL SKIN
LOCATION DETAILED: LEFT INFERIOR CENTRAL MALAR CHEEK
LOCATION DETAILED: RIGHT SUPERIOR LATERAL MALAR CHEEK
LOCATION DETAILED: RIGHT LATERAL MALAR CHEEK
LOCATION DETAILED: LEFT MEDIAL MALAR CHEEK
LOCATION DETAILED: LEFT CENTRAL MALAR CHEEK
LOCATION DETAILED: RIGHT MID-UPPER BACK
LOCATION DETAILED: LEFT SUPERIOR LATERAL MALAR CHEEK

## 2024-01-22 ASSESSMENT — LOCATION SIMPLE DESCRIPTION DERM
LOCATION SIMPLE: RIGHT CHEEK
LOCATION SIMPLE: LEFT CHEEK
LOCATION SIMPLE: RIGHT UPPER BACK
LOCATION SIMPLE: ABDOMEN

## 2024-01-22 ASSESSMENT — LOCATION ZONE DERM
LOCATION ZONE: TRUNK
LOCATION ZONE: FACE

## 2024-01-22 NOTE — PROCEDURE: SUNSCREEN RECOMMENDATIONS
Products Recommended: Elta MD UV Clear sunscreen
Detail Level: Simple
General Sunscreen Counseling: I recommended a broad spectrum sunscreen with a SPF of 30 or higher.  I explained that SPF 30 sunscreens block approximately 97 percent of the sun's harmful rays.  Sunscreens should be applied at least 15 minutes prior to expected sun exposure and then every 2 hours after that as long as sun exposure continues. If swimming or exercising sunscreen should be reapplied every 45 minutes to an hour after getting wet or sweating.  One ounce, or the equivalent of a shot glass full of sunscreen, is adequate to protect the skin not covered by a bathing suit. I also recommended a lip balm with a sunscreen as well. Sun protective clothing can be used in lieu of sunscreen but must be worn the entire time you are exposed to the sun's rays.

## 2024-01-22 NOTE — PROCEDURE: LIQUID NITROGEN
Render Note In Bullet Format When Appropriate: Yes
Detail Level: Detailed
Duration Of Freeze Thaw-Cycle (Seconds): 3
Number Of Freeze-Thaw Cycles: 3 freeze-thaw cycles
Consent: The patient's consent was obtained including but not limited to risks of crusting, scabbing, blistering, scarring, darker or lighter pigmentary change, recurrence, incomplete removal and infection.
Post-Care Instructions: I reviewed with the patient in detail post-care instructions. Patient is to wear sunprotection, and avoid picking at any of the treated lesions. Pt may apply Vaseline to crusted or scabbing areas. Patient has also received a handout with instructions on caring for the wound and office contact information.

## 2024-03-03 ENCOUNTER — HEALTH MAINTENANCE LETTER (OUTPATIENT)
Age: 74
End: 2024-03-03

## 2024-05-13 ENCOUNTER — DOCUMENTATION ONLY (OUTPATIENT)
Dept: LAB | Facility: CLINIC | Age: 74
End: 2024-05-13
Payer: MEDICARE

## 2024-05-13 DIAGNOSIS — C88.40 MALT (MUCOSA ASSOCIATED LYMPHOID TISSUE): Primary | ICD-10-CM

## 2024-05-13 NOTE — PROGRESS NOTES
Filemon PÉREZ Edward has an upcoming lab appointment:    Future Appointments   Date Time Provider Department Center   5/16/2024 10:15 AM [facility]   5/21/2024 10:00 AM Saran Fairchild MD [facility]   5/23/2024  3:45 PM Lashonda Jordan MD [facility]   8/5/2024 10:00 AM [id number]   8/5/2024 11:15 AM [facility]   8/15/2024 11:15 AM Lashonda Jordan MD [facility]     Please change the expected date on the labs for 5/20/24 to 5/16/2024    There is no order available. Please review and place either future orders or HMPO (Review of Health Maintenance Protocol Orders), as appropriate.    Health Maintenance Due   Topic    LIPID      Fetlework Kamran

## 2024-05-14 ENCOUNTER — DOCUMENTATION ONLY (OUTPATIENT)
Dept: LAB | Facility: CLINIC | Age: 74
End: 2024-05-14
Payer: MEDICARE

## 2024-05-14 DIAGNOSIS — Z12.5 ENCOUNTER FOR SCREENING FOR MALIGNANT NEOPLASM OF PROSTATE: Primary | ICD-10-CM

## 2024-05-14 DIAGNOSIS — R97.20 ELEVATED PROSTATE SPECIFIC ANTIGEN (PSA): ICD-10-CM

## 2024-05-14 NOTE — PROGRESS NOTES
This patient is coming in for a PSA for Saran Fairchild MD on 5/16/2024 at [facility] laboratory. There are no orders in the chart for Dr. Fairchild. Please place an order before the scheduled appointment if necessary.    Thank you,     - Gale OLIVIER From lab

## 2024-05-16 ENCOUNTER — LAB (OUTPATIENT)
Dept: LAB | Facility: CLINIC | Age: 74
End: 2024-05-16
Payer: MEDICARE

## 2024-05-16 DIAGNOSIS — I25.10 CORONARY ATHEROSCLEROSIS: Primary | ICD-10-CM

## 2024-05-16 DIAGNOSIS — C88.40 MALT (MUCOSA ASSOCIATED LYMPHOID TISSUE): ICD-10-CM

## 2024-05-16 LAB
BASOPHILS # BLD AUTO: 0 10E3/UL (ref 0–0.2)
BASOPHILS NFR BLD AUTO: 0 %
EOSINOPHIL # BLD AUTO: 0.3 10E3/UL (ref 0–0.7)
EOSINOPHIL NFR BLD AUTO: 7 %
ERYTHROCYTE [DISTWIDTH] IN BLOOD BY AUTOMATED COUNT: 14 % (ref 10–15)
HCT VFR BLD AUTO: 41.1 % (ref 40–53)
HGB BLD-MCNC: 14.1 G/DL (ref 13.3–17.7)
IMM GRANULOCYTES # BLD: 0 10E3/UL
IMM GRANULOCYTES NFR BLD: 0 %
LYMPHOCYTES # BLD AUTO: 1.2 10E3/UL (ref 0.8–5.3)
LYMPHOCYTES NFR BLD AUTO: 24 %
MCH RBC QN AUTO: 30.2 PG (ref 26.5–33)
MCHC RBC AUTO-ENTMCNC: 34.3 G/DL (ref 31.5–36.5)
MCV RBC AUTO: 88 FL (ref 78–100)
MONOCYTES # BLD AUTO: 0.4 10E3/UL (ref 0–1.3)
MONOCYTES NFR BLD AUTO: 9 %
NEUTROPHILS # BLD AUTO: 3 10E3/UL (ref 1.6–8.3)
NEUTROPHILS NFR BLD AUTO: 60 %
PLATELET # BLD AUTO: 163 10E3/UL (ref 150–450)
RBC # BLD AUTO: 4.67 10E6/UL (ref 4.4–5.9)
WBC # BLD AUTO: 5 10E3/UL (ref 4–11)

## 2024-05-16 PROCEDURE — 36415 COLL VENOUS BLD VENIPUNCTURE: CPT

## 2024-05-16 PROCEDURE — 80053 COMPREHEN METABOLIC PANEL: CPT

## 2024-05-16 PROCEDURE — 85025 COMPLETE CBC W/AUTO DIFF WBC: CPT

## 2024-05-16 PROCEDURE — 83615 LACTATE (LD) (LDH) ENZYME: CPT

## 2024-05-17 LAB
ALBUMIN SERPL BCG-MCNC: 4.2 G/DL (ref 3.5–5.2)
ALP SERPL-CCNC: 84 U/L (ref 40–150)
ALT SERPL W P-5'-P-CCNC: 58 U/L (ref 0–70)
ANION GAP SERPL CALCULATED.3IONS-SCNC: 12 MMOL/L (ref 7–15)
AST SERPL W P-5'-P-CCNC: 107 U/L (ref 0–45)
BILIRUB SERPL-MCNC: 0.7 MG/DL
BUN SERPL-MCNC: 25.7 MG/DL (ref 8–23)
CALCIUM SERPL-MCNC: 8.9 MG/DL (ref 8.8–10.2)
CHLORIDE SERPL-SCNC: 103 MMOL/L (ref 98–107)
CREAT SERPL-MCNC: 0.96 MG/DL (ref 0.67–1.17)
DEPRECATED HCO3 PLAS-SCNC: 24 MMOL/L (ref 22–29)
EGFRCR SERPLBLD CKD-EPI 2021: 84 ML/MIN/1.73M2
GLUCOSE SERPL-MCNC: 95 MG/DL (ref 70–99)
LDH SERPL L TO P-CCNC: 425 U/L (ref 0–250)
POTASSIUM SERPL-SCNC: 4.3 MMOL/L (ref 3.4–5.3)
PROT SERPL-MCNC: 6.4 G/DL (ref 6.4–8.3)
SODIUM SERPL-SCNC: 139 MMOL/L (ref 135–145)

## 2024-05-23 ENCOUNTER — ONCOLOGY VISIT (OUTPATIENT)
Dept: ONCOLOGY | Facility: CLINIC | Age: 74
End: 2024-05-23
Attending: STUDENT IN AN ORGANIZED HEALTH CARE EDUCATION/TRAINING PROGRAM
Payer: MEDICARE

## 2024-05-23 VITALS
TEMPERATURE: 98.1 F | SYSTOLIC BLOOD PRESSURE: 142 MMHG | DIASTOLIC BLOOD PRESSURE: 78 MMHG | WEIGHT: 186 LBS | BODY MASS INDEX: 27.47 KG/M2 | RESPIRATION RATE: 16 BRPM | HEART RATE: 81 BPM | OXYGEN SATURATION: 98 %

## 2024-05-23 DIAGNOSIS — C88.40 MALT (MUCOSA ASSOCIATED LYMPHOID TISSUE): Primary | ICD-10-CM

## 2024-05-23 PROCEDURE — G0463 HOSPITAL OUTPT CLINIC VISIT: HCPCS | Performed by: STUDENT IN AN ORGANIZED HEALTH CARE EDUCATION/TRAINING PROGRAM

## 2024-05-23 PROCEDURE — 99214 OFFICE O/P EST MOD 30 MIN: CPT | Performed by: STUDENT IN AN ORGANIZED HEALTH CARE EDUCATION/TRAINING PROGRAM

## 2024-05-23 ASSESSMENT — PAIN SCALES - GENERAL: PAINLEVEL: NO PAIN (0)

## 2024-05-23 NOTE — LETTER
5/23/2024         RE: Filemon Leon  [id number]  [address]  [place] [address]        Dear Colleague,    Thank you for referring your patient, Filemon Leon, to the [facility]. Please see a copy of my visit note below.      Subjective  Filemon is a 73 year old, presenting for the following health issues:  Oncology Clinic Visit    HPI     Oncology History Overview Note   This is a 73-year-old male with history of coronary artery disease, gout, kidney stones, hypothyroidism comes in with newly diagnosed bladder mucosa-associated lymphoid tissue lymphoma.    He has several years history of kidney stones.  Initially there was incidentally found on CT scan 2 to 4 mm in size.  He has been following up with urology in [place].  Last winter, he had a few episodes of hematuria.  That led to neurologic work-up.  A CT scan was done which showed growing kidney stones. On a routine cystoscopy he was found to have a bladder stone.  He underwent cystoscopy, right ureteroscopy with thulium fiber laser lithotripsy right retrograde pyelogram, right ureteral stent placement, bladder stone removal.  On cystoscopy about 2.5 cm sessile bladder tumor was found.  Which was resected until muscle layer for concern of bladder cancer.    Pathology of bladder mass showed atypical lymphoplasmacytic infiltrates with lambda restricted plasma cells suggestive of mucosa-associated lymphoid tissue lymphoma.  BCGR is negative and so is MYD88 mutation. BCGR is sensitive to detect clonal B cell process in about 88% of cases, rendering 12% clonal processes undetectable.     PET scan does not show any hypermetabolic lesion concerning for lymphoma.       Patient comes today for follow up. No fevers, chills, night sweats or weight loss, no lymphadenopathy. No pain. He had a couple cystoscopy, last one was 3 weeks ago both negative for lymphoma.            Objective   BP (!) 142/78   Pulse 81   Temp 98.1   F (36.7  C)   Resp 16   Wt 84.4 kg (186 lb)   SpO2 98%   BMI 27.47 kg/m    Body mass index is 27.47 kg/m .  Physical Exam   GENERAL:  Alert oriented well nourished  HEAD: normocephalic atraumatic  SKIN:  no rash, hives, other lesions.  LYMPHATIC: no abnormal lymph nodes palable in cervical, axillary, supraclavicular or inguinal area.  RESP:  No rales or rhonchi, breath sounds bilaterally equal and vesicular  CV:  No tachycardia, S1 S2 normal No murmur.  GI:  Abdomen soft nontender no hepato or splenomegaly  MUSCULOSKELETAL:  No visible joint redness or swelling.  NEURO:  No gross weakness gait normal  PSYCH: pleasant affect    Labs: I personally reviewed complete blood count, differential, renal function test, liver function tests and LDH.  No cytopenias, LFTs within normal limits, renal function test within normal limits and LDH is mildly elevated.    Assessment and Plan:    1) Mucosa associated lymphoid tissue lymphoma of the bladder:  - He has no clinical evidence of lymphoma relapse.  I discussed with him that he has very localized evidence of mild.  His prior 2 cystoscopies have been clean.  Clinical follow-up at this time is appropriate with CT scans or endoscopy done if any B symptoms arise or any urinary symptoms arise.  Patient agrees to that plan.  -He plans to see urologist and there is a plan for another cystoscopy in the future.  I will see him in a year for follow-up.    2) elevated alanine transaminase:  -Patient has no symptoms of liver disease, none of the other liver enzymes are elevated, I recommend rechecking in fall when he is due his annual wellness check.    3) elevated LDH:  -Patient has chronically elevated LDH, probably stress related to procedures can elevated LDH.  He is a very recent cystoscopy that has shown no lymphoma, no B symptoms.  I would continue expectant management.  We can get a scan if there are any symptoms.    Total time spent on date of service in review of medical  records, review of labs, history taking, physical exam, discussion of assessment and plan, counseling and patient education is 30 minutes.    Lashonda Jordan MD  Attending Physician  Pager [phone]              Signed Electronically by: Lashonda Jordan MD

## 2024-05-23 NOTE — PROGRESS NOTES
Jose Martin Colbert is a 73 year old, presenting for the following health issues:  Oncology Clinic Visit    HPI     Oncology History Overview Note   This is a 73-year-old male with history of coronary artery disease, gout, kidney stones, hypothyroidism comes in with newly diagnosed bladder mucosa-associated lymphoid tissue lymphoma.    He has several years history of kidney stones.  Initially there was incidentally found on CT scan 2 to 4 mm in size.  He has been following up with urology in [place].  Last winter, he had a few episodes of hematuria.  That led to neurologic work-up.  A CT scan was done which showed growing kidney stones. On a routine cystoscopy he was found to have a bladder stone.  He underwent cystoscopy, right ureteroscopy with thulium fiber laser lithotripsy right retrograde pyelogram, right ureteral stent placement, bladder stone removal.  On cystoscopy about 2.5 cm sessile bladder tumor was found.  Which was resected until muscle layer for concern of bladder cancer.    Pathology of bladder mass showed atypical lymphoplasmacytic infiltrates with lambda restricted plasma cells suggestive of mucosa-associated lymphoid tissue lymphoma.  BCGR is negative and so is MYD88 mutation. BCGR is sensitive to detect clonal B cell process in about 88% of cases, rendering 12% clonal processes undetectable.     PET scan does not show any hypermetabolic lesion concerning for lymphoma.       Patient comes today for follow up. No fevers, chills, night sweats or weight loss, no lymphadenopathy. No pain. He had a couple cystoscopy, last one was 3 weeks ago both negative for lymphoma.            Objective    BP (!) 142/78   Pulse 81   Temp 98.1  F (36.7  C)   Resp 16   Wt 84.4 kg (186 lb)   SpO2 98%   BMI 27.47 kg/m    Body mass index is 27.47 kg/m .  Physical Exam   GENERAL:  Alert oriented well nourished  HEAD: normocephalic atraumatic  SKIN:  no rash, hives, other lesions.  LYMPHATIC: no abnormal lymph  nodes palable in cervical, axillary, supraclavicular or inguinal area.  RESP:  No rales or rhonchi, breath sounds bilaterally equal and vesicular  CV:  No tachycardia, S1 S2 normal No murmur.  GI:  Abdomen soft nontender no hepato or splenomegaly  MUSCULOSKELETAL:  No visible joint redness or swelling.  NEURO:  No gross weakness gait normal  PSYCH: pleasant affect    Labs: I personally reviewed complete blood count, differential, renal function test, liver function tests and LDH.  No cytopenias, LFTs within normal limits, renal function test within normal limits and LDH is mildly elevated.    Assessment and Plan:    1) Mucosa associated lymphoid tissue lymphoma of the bladder:  - He has no clinical evidence of lymphoma relapse.  I discussed with him that he has very localized evidence of mild.  His prior 2 cystoscopies have been clean.  Clinical follow-up at this time is appropriate with CT scans or endoscopy done if any B symptoms arise or any urinary symptoms arise.  Patient agrees to that plan.  -He plans to see urologist and there is a plan for another cystoscopy in the future.  I will see him in a year for follow-up.    2) elevated alanine transaminase:  -Patient has no symptoms of liver disease, none of the other liver enzymes are elevated, I recommend rechecking in fall when he is due his annual wellness check.    3) elevated LDH:  -Patient has chronically elevated LDH, probably stress related to procedures can elevated LDH.  He is a very recent cystoscopy that has shown no lymphoma, no B symptoms.  I would continue expectant management.  We can get a scan if there are any symptoms.    Total time spent on date of service in review of medical records, review of labs, history taking, physical exam, discussion of assessment and plan, counseling and patient education is 30 minutes.    Lashonda Jordan MD  Attending Physician  Pager [phone]              Signed Electronically by: Lashonda Jordan MD

## 2024-05-23 NOTE — NURSING NOTE
"Oncology Rooming Note    May 23, 2024 3:41 PM   Filemon Leon is a 73 year old male who presents for:    Chief Complaint   Patient presents with    Oncology Clinic Visit     Initial Vitals: BP (!) 142/78   Pulse 81   Temp 98.1  F (36.7  C)   Resp 16   Wt 84.4 kg (186 lb)   SpO2 98%   BMI 27.47 kg/m   Estimated body mass index is 27.47 kg/m  as calculated from the following:    Height as of 8/7/23: 1.753 m (5' 9\").    Weight as of this encounter: 84.4 kg (186 lb). Body surface area is 2.03 meters squared.  No Pain (0) Comment: Data Unavailable   No LMP for male patient.  Allergies reviewed: Yes  Medications reviewed: Yes    Medications: Medication refills not needed today.  Pharmacy name entered into [facility]:    [id number] - [place], [place] - [address] AT [facility] [address]  [id number] - [place], [place] - [address] AT [facility] OF [address] & [facility] [address] - [place], [place] - [address]    Frailty Screening:   Is the patient here for a new oncology consult visit in cancer care? 2. No      Clinical concerns:        Renetta Mayes              "

## 2024-05-28 ENCOUNTER — TELEPHONE (OUTPATIENT)
Dept: INTERNAL MEDICINE | Facility: CLINIC | Age: 74
End: 2024-05-28
Payer: MEDICARE

## 2024-05-28 NOTE — TELEPHONE ENCOUNTER
Patient Quality Outreach    Patient is due for the following:   Hypertension -  Hypertension follow-up visit  Physical Annual Wellness Visit    Next Steps:   Schedule a Annual Wellness Visit    Type of outreach:    Sent [employer] message.      Questions for provider review:               Yulia Dutton CMA

## 2024-06-03 ENCOUNTER — PATIENT OUTREACH (OUTPATIENT)
Dept: ONCOLOGY | Facility: CLINIC | Age: 74
End: 2024-06-03
Payer: MEDICARE

## 2024-06-03 NOTE — PROGRESS NOTES
[facility]: Cancer Care Chart Review                                                                                        Situation: Patient chart reviewed by care coordinator.     Background: Pt seen by Dr. Jordan on 5/23/24     Assessment: No coordination needed at this time by RNCC. Status set as Maintenance for enrollment.     Plan/Recommendations: Follow up with Dr. Jordan in 1 year.     PHILLIP GanN, RN  RN Care Coordinator   [phone]

## 2024-11-04 ENCOUNTER — APPOINTMENT (RX ONLY)
Dept: URBAN - METROPOLITAN AREA CLINIC 116 | Facility: CLINIC | Age: 74
Setting detail: DERMATOLOGY
End: 2024-11-04

## 2024-11-04 DIAGNOSIS — Z85.828 PERSONAL HISTORY OF OTHER MALIGNANT NEOPLASM OF SKIN: ICD-10-CM

## 2024-11-04 DIAGNOSIS — D18.0 HEMANGIOMA: ICD-10-CM

## 2024-11-04 DIAGNOSIS — L57.0 ACTINIC KERATOSIS: ICD-10-CM

## 2024-11-04 DIAGNOSIS — L82.1 OTHER SEBORRHEIC KERATOSIS: ICD-10-CM

## 2024-11-04 DIAGNOSIS — L81.4 OTHER MELANIN HYPERPIGMENTATION: ICD-10-CM

## 2024-11-04 PROBLEM — D18.01 HEMANGIOMA OF SKIN AND SUBCUTANEOUS TISSUE: Status: ACTIVE | Noted: 2024-11-04

## 2024-11-04 PROCEDURE — 99213 OFFICE O/P EST LOW 20 MIN: CPT

## 2024-11-04 PROCEDURE — ? SUNSCREEN RECOMMENDATIONS

## 2024-11-04 PROCEDURE — ? ORDER FOR PHOTODYNAMIC THERAPY

## 2024-11-04 PROCEDURE — ? COUNSELING

## 2024-11-04 ASSESSMENT — LOCATION ZONE DERM
LOCATION ZONE: SCALP
LOCATION ZONE: FACE
LOCATION ZONE: TRUNK

## 2024-11-04 ASSESSMENT — LOCATION DETAILED DESCRIPTION DERM
LOCATION DETAILED: POSTERIOR MID-PARIETAL SCALP
LOCATION DETAILED: RIGHT MID-UPPER BACK
LOCATION DETAILED: LEFT CENTRAL MALAR CHEEK
LOCATION DETAILED: PERIUMBILICAL SKIN
LOCATION DETAILED: RIGHT INFERIOR MEDIAL FOREHEAD

## 2024-11-04 ASSESSMENT — LOCATION SIMPLE DESCRIPTION DERM
LOCATION SIMPLE: ABDOMEN
LOCATION SIMPLE: RIGHT UPPER BACK
LOCATION SIMPLE: LEFT CHEEK
LOCATION SIMPLE: POSTERIOR SCALP
LOCATION SIMPLE: RIGHT FOREHEAD

## 2024-11-04 NOTE — PROCEDURE: ORDER FOR PHOTODYNAMIC THERAPY
Arms And Hands Incubation Time: 2 hours
Face And Scalp Incubation Time: 2 Hour for the face and 2 Hours for the scalp
Back Incubation Time: 3 Hours
Occlusion: No
Location: Face and Scalp
Face And Neck Incubation Time: 2hour
Pdt Type: HAMIDA-U
Face Incubation Time: 90 minute
Chest Incubation Time: 2 hour
Detail Level: Zone
Consent: The procedure and risks were reviewed with the patient including but not limited to: burning, pigmentary changes, pain, blistering, scabbing, redness, and the possibility of needing numerous treatments. Strict photoprotection after the procedure was also discussed.
Face, Ears And  Scalp Incubation Time: 1 Hour
Photosensitizer: Levulan
Face And Ears Incubation Time: 90 min
Frequency Of Pdt: Single Treatment

## 2024-12-02 ENCOUNTER — TRANSFERRED RECORDS (OUTPATIENT)
Dept: HEALTH INFORMATION MANAGEMENT | Facility: CLINIC | Age: 74
End: 2024-12-02

## 2025-01-08 ENCOUNTER — TRANSFERRED RECORDS (OUTPATIENT)
Dept: HEALTH INFORMATION MANAGEMENT | Facility: CLINIC | Age: 75
End: 2025-01-08
Payer: MEDICARE

## 2025-01-09 ENCOUNTER — APPOINTMENT (OUTPATIENT)
Dept: URBAN - METROPOLITAN AREA CLINIC 121 | Facility: CLINIC | Age: 75
Setting detail: DERMATOLOGY
End: 2025-01-09

## 2025-01-09 DIAGNOSIS — L57.0 ACTINIC KERATOSIS: ICD-10-CM

## 2025-01-09 PROCEDURE — 96574 DBRDMT PRMLG LES W/PDT: CPT

## 2025-01-09 PROCEDURE — ? INVENTORY

## 2025-01-09 PROCEDURE — ? PDT: BLUE

## 2025-01-09 ASSESSMENT — LOCATION ZONE DERM: LOCATION ZONE: FACE

## 2025-01-09 ASSESSMENT — LOCATION SIMPLE DESCRIPTION DERM: LOCATION SIMPLE: LEFT CHEEK

## 2025-01-09 ASSESSMENT — LOCATION DETAILED DESCRIPTION DERM: LOCATION DETAILED: LEFT SUPERIOR MEDIAL BUCCAL CHEEK

## 2025-01-09 NOTE — PROCEDURE: PDT: BLUE
Total Number Of Aks Treated (Optional To Report): 0
Occlusion: No
Consent: Written consent obtained.  The risks were reviewed with the patient including but not limited to: pigmentary changes, pain, blistering, scabbing, redness, and the remote possibility of scarring.
Incubation Start Time: 11:00
Incubation End Time: 1:00
Show Anesthesia In Plan?: Yes
Post-Care Instructions: I reviewed with the patient in detail post-care instructions. Patient is to avoid sunlight for the next 2 days, and wear sun protection. Patients may expect sunburn like redness, discomfort and scabbing.
Number Of Kerasticks/Tubes Billed For: 1
Medical Necessity: Precancerous Lesions
Which Photosensitizer Was Used: Levulan
Ndc# (Optional): 37225-631-21
Debridement Text (Will Only Render In Visit Note If You Select Debridement Option Under Who Performed The Pdt Field): Prior to application of the photodynamic medication the hyperkeratotic lesions were curetted to make them more amenable to therapy.
Illumination Time: 00:16:40
Show Inventory Tab: Show
Who Performed The Pdt?: Performed by MD MARBIN, JANICE or ROSARIO with Pre-Procedure Debridement of Hyperkeratotic Lesions (06307)
Who Performed The Pdt (Provider): Dr. Zhou
Detail Level: Zone
Anesthesia Type: 1% lidocaine with epinephrine
Incubation Time: 2 hours
Pre-Procedure Text: The treatment areas were cleaned and prepped in the usual fashion.
Light Source: Dalton-U

## 2025-01-21 ENCOUNTER — TRANSFERRED RECORDS (OUTPATIENT)
Dept: HEALTH INFORMATION MANAGEMENT | Facility: CLINIC | Age: 75
End: 2025-01-21
Payer: MEDICARE

## 2025-01-21 LAB
ALT SERPL-CCNC: 35 U/L (ref 0–40)
AST SERPL-CCNC: 55 U/L (ref 0–40)
CREATININE (EXTERNAL): 0.7 MG/DL (ref 0.3–1.2)
GFR ESTIMATED (EXTERNAL): 97.29 ML/MIN (ref 60–200)
GLUCOSE (EXTERNAL): 91 MG/DL (ref 70–105)
POTASSIUM (EXTERNAL): 4.2 MEQ/L (ref 3.3–5.1)

## 2025-01-22 ENCOUNTER — TRANSFERRED RECORDS (OUTPATIENT)
Dept: HEALTH INFORMATION MANAGEMENT | Facility: CLINIC | Age: 75
End: 2025-01-22
Payer: MEDICARE

## 2025-01-23 ENCOUNTER — APPOINTMENT (OUTPATIENT)
Dept: URBAN - METROPOLITAN AREA CLINIC 121 | Facility: CLINIC | Age: 75
Setting detail: DERMATOLOGY
End: 2025-01-23

## 2025-01-23 DIAGNOSIS — L57.0 ACTINIC KERATOSIS: ICD-10-CM

## 2025-01-23 PROCEDURE — ? INVENTORY

## 2025-01-23 PROCEDURE — ? PDT: BLUE

## 2025-01-23 PROCEDURE — 96574 DBRDMT PRMLG LES W/PDT: CPT

## 2025-01-23 ASSESSMENT — LOCATION ZONE DERM: LOCATION ZONE: FACE

## 2025-01-23 ASSESSMENT — LOCATION SIMPLE DESCRIPTION DERM: LOCATION SIMPLE: LEFT CHEEK

## 2025-01-23 ASSESSMENT — LOCATION DETAILED DESCRIPTION DERM: LOCATION DETAILED: LEFT LATERAL MALAR CHEEK

## 2025-01-23 NOTE — PROCEDURE: PDT: BLUE
Consent: Written consent obtained.  The risks were reviewed with the patient including but not limited to: pigmentary changes, pain, blistering, scabbing, redness, and the remote possibility of scarring.
Who Performed The Pdt (Provider): Dr. Zhou
Bill For Wasted Drug?: no
Detail Level: Zone
Eye Protection Applied?: Yes
Incubation End Time: 11:30
Post-Care Instructions: I reviewed with the patient in detail post-care instructions. Patient is to avoid sunlight for the next 2 days, and wear sun protection. Patients may expect sunburn like redness, discomfort and scabbing.
Illumination Time: 00:16:40
Total Number Of Aks Treated (Optional To Report): 0
Who Performed The Pdt?: Performed by MD MARBIN, JANICE or ROSARIO with Pre-Procedure Debridement of Hyperkeratotic Lesions (50769)
Anesthesia Type: 1% lidocaine with epinephrine
Incubation Time: 2 hours
Light Source: Dalton-U
Number Of Kerasticks/Tubes Billed For: 1
Pre-Procedure Text: The treatment areas were cleaned and prepped in the usual fashion.
Which Photosensitizer Was Used: Levulan
Debridement Text (Will Only Render In Visit Note If You Select Debridement Option Under Who Performed The Pdt Field): Prior to application of the photodynamic medication the hyperkeratotic lesions were curetted to make them more amenable to therapy.
Show Inventory Tab: Show
Ndc# (Optional): [id number]
Medical Necessity: Precancerous Lesions
Incubation Start Time: 9:30

## 2025-03-15 ENCOUNTER — HEALTH MAINTENANCE LETTER (OUTPATIENT)
Age: 75
End: 2025-03-15

## 2025-05-09 NOTE — PROCEDURE: MIPS QUALITY
Detail Level: Detailed
Refill Routing Note   Medication(s) are not appropriate for processing by [facility] for the following reason(s):        Outside of protocol    [facility] action(s):  Route               Appointments  past 12m or future 3m with PCP    Date Provider   Last Visit   2/6/2025 John Vinson MD   Next Visit   Visit date not found John Vinson MD   ED visits in past 90 days: 1        Note composed:11:15 AM 05/09/2025          
Quality 130: Documentation Of Current Medications In The Medical Record: Current Medications Documented
Additional Notes: Pain 0/10
Quality 131: Pain Assessment And Follow-Up: Pain assessment using a standardized tool is documented as negative, no follow-up plan required

## 2025-05-19 ENCOUNTER — LAB (OUTPATIENT)
Dept: LAB | Facility: CLINIC | Age: 75
End: 2025-05-19
Payer: MEDICARE

## 2025-05-19 DIAGNOSIS — C88.40 MALT (MUCOSA ASSOCIATED LYMPHOID TISSUE) (H): ICD-10-CM

## 2025-05-19 LAB
ALBUMIN SERPL BCG-MCNC: 4.4 G/DL (ref 3.5–5.2)
ALP SERPL-CCNC: 77 U/L (ref 40–150)
ALT SERPL W P-5'-P-CCNC: 48 U/L (ref 0–70)
ANION GAP SERPL CALCULATED.3IONS-SCNC: 12 MMOL/L (ref 7–15)
AST SERPL W P-5'-P-CCNC: 80 U/L (ref 0–45)
BASOPHILS # BLD AUTO: 0 10E3/UL (ref 0–0.2)
BASOPHILS NFR BLD AUTO: 0 %
BILIRUB SERPL-MCNC: 0.6 MG/DL
BUN SERPL-MCNC: 29.7 MG/DL (ref 8–23)
CALCIUM SERPL-MCNC: 9.8 MG/DL (ref 8.8–10.4)
CHLORIDE SERPL-SCNC: 104 MMOL/L (ref 98–107)
CREAT SERPL-MCNC: 0.9 MG/DL (ref 0.67–1.17)
EGFRCR SERPLBLD CKD-EPI 2021: 90 ML/MIN/1.73M2
EOSINOPHIL # BLD AUTO: 0.3 10E3/UL (ref 0–0.7)
EOSINOPHIL NFR BLD AUTO: 6 %
ERYTHROCYTE [DISTWIDTH] IN BLOOD BY AUTOMATED COUNT: 16.3 % (ref 10–15)
GLUCOSE SERPL-MCNC: 80 MG/DL (ref 70–99)
HCO3 SERPL-SCNC: 26 MMOL/L (ref 22–29)
HCT VFR BLD AUTO: 40 % (ref 40–53)
HGB BLD-MCNC: 13.7 G/DL (ref 13.3–17.7)
IMM GRANULOCYTES # BLD: 0 10E3/UL
IMM GRANULOCYTES NFR BLD: 0 %
LDH SERPL L TO P-CCNC: 426 U/L (ref 0–250)
LYMPHOCYTES # BLD AUTO: 1.2 10E3/UL (ref 0.8–5.3)
LYMPHOCYTES NFR BLD AUTO: 24 %
MCH RBC QN AUTO: 28.2 PG (ref 26.5–33)
MCHC RBC AUTO-ENTMCNC: 34.3 G/DL (ref 31.5–36.5)
MCV RBC AUTO: 82 FL (ref 78–100)
MONOCYTES # BLD AUTO: 0.5 10E3/UL (ref 0–1.3)
MONOCYTES NFR BLD AUTO: 10 %
NEUTROPHILS # BLD AUTO: 3.1 10E3/UL (ref 1.6–8.3)
NEUTROPHILS NFR BLD AUTO: 60 %
PLATELET # BLD AUTO: 204 10E3/UL (ref 150–450)
POTASSIUM SERPL-SCNC: 4.9 MMOL/L (ref 3.4–5.3)
PROT SERPL-MCNC: 7.1 G/DL (ref 6.4–8.3)
RBC # BLD AUTO: 4.86 10E6/UL (ref 4.4–5.9)
SODIUM SERPL-SCNC: 142 MMOL/L (ref 135–145)
WBC # BLD AUTO: 5.1 10E3/UL (ref 4–11)

## 2025-05-19 PROCEDURE — 83615 LACTATE (LD) (LDH) ENZYME: CPT

## 2025-05-19 PROCEDURE — 80053 COMPREHEN METABOLIC PANEL: CPT

## 2025-05-19 PROCEDURE — 36415 COLL VENOUS BLD VENIPUNCTURE: CPT

## 2025-05-19 PROCEDURE — 85025 COMPLETE CBC W/AUTO DIFF WBC: CPT

## 2025-05-22 ENCOUNTER — ONCOLOGY VISIT (OUTPATIENT)
Dept: ONCOLOGY | Facility: CLINIC | Age: 75
End: 2025-05-22
Attending: STUDENT IN AN ORGANIZED HEALTH CARE EDUCATION/TRAINING PROGRAM
Payer: MEDICARE

## 2025-05-22 VITALS
DIASTOLIC BLOOD PRESSURE: 76 MMHG | WEIGHT: 173 LBS | TEMPERATURE: 97.5 F | HEIGHT: 69 IN | OXYGEN SATURATION: 99 % | BODY MASS INDEX: 25.62 KG/M2 | SYSTOLIC BLOOD PRESSURE: 121 MMHG | HEART RATE: 58 BPM | RESPIRATION RATE: 18 BRPM

## 2025-05-22 DIAGNOSIS — C88.40 MALT (MUCOSA ASSOCIATED LYMPHOID TISSUE) (H): Primary | ICD-10-CM

## 2025-05-22 PROCEDURE — G0463 HOSPITAL OUTPT CLINIC VISIT: HCPCS | Performed by: STUDENT IN AN ORGANIZED HEALTH CARE EDUCATION/TRAINING PROGRAM

## 2025-05-22 RX ORDER — CEPHALEXIN 500 MG/1
CAPSULE ORAL
COMMUNITY

## 2025-05-22 ASSESSMENT — PAIN SCALES - GENERAL: PAINLEVEL_OUTOF10: NO PAIN (0)

## 2025-05-22 NOTE — LETTER
5/22/2025      Filemon Leon  [address]  [address]  [place] [address]      Dear Colleague,    Thank you for referring your patient, Filemon Leon, to the [facility]. Please see a copy of my visit note below.      Subjective  Filemon is a 74 year old, presenting for the following health issues:  No chief complaint on file.    HPI      Oncology History Overview Note   This is a 73-year-old male with history of coronary artery disease, gout, kidney stones, hypothyroidism comes in with newly diagnosed bladder mucosa-associated lymphoid tissue lymphoma.    He has several years history of kidney stones.  Initially there was incidentally found on CT scan 2 to 4 mm in size.  He has been following up with urology in [place].  Last winter, he had a few episodes of hematuria.  That led to neurologic work-up.  A CT scan was done which showed growing kidney stones. On a routine cystoscopy he was found to have a bladder stone.  He underwent cystoscopy, right ureteroscopy with thulium fiber laser lithotripsy right retrograde pyelogram, right ureteral stent placement, bladder stone removal.  On cystoscopy about 2.5 cm sessile bladder tumor was found.  Which was resected until muscle layer for concern of bladder cancer.    Pathology of bladder mass showed atypical lymphoplasmacytic infiltrates with lambda restricted plasma cells suggestive of mucosa-associated lymphoid tissue lymphoma.  BCGR is negative and so is MYD88 mutation. BCGR is sensitive to detect clonal B cell process in about 88% of cases, rendering 12% clonal processes undetectable.     PET scan does not show any hypermetabolic lesion concerning for lymphoma.      No history exists.     Patient comes today for follow up. No fevers, chills, night sweats or weight loss, no lymphadenopathy. No pain.        Objective   There were no vitals taken for this visit.  There is no height or weight on file to calculate BMI.  Physical  Exam   GENERAL:  Alert oriented well nourished  HEAD: normocephalic atraumatic  SKIN:  no rash, hives, other lesions.  LYMPHATIC: no abnormal lymph nodes palable in cervical, axillary, supraclavicular or inguinal area.  RESP:  No rales or rhonchi, breath sounds bilaterally equal and vesicular  CV:  No tachycardia, S1 S2 normal No murmur.  GI:  Abdomen soft nontender no hepato or splenomegaly  MUSCULOSKELETAL:  No visible joint redness or swelling.  NEURO:  No gross weakness gait normal  PSYCH: pleasant affect    Labs: I personally reviewed complete blood count, differential, renal function test, liver function tests LDH.  No cytopenias, LFTs within normal limits, renal function test within normal limits and LDH is normal as well.    Assessment and Plan:    1) Bladder mucosa associated lymphoid tissue lymphoma:  - He has no clinical evidence of lymphoma recurrence or progression.   - I will see him in a year with labs.  - He sees a urologist regularly, he had 3 negative cystoscopies in a row and now will have annual cystoscopies. This is a good plan that will serve lymphoma surveillance well.    Total time spent on date of service in review of medical records, review of labs, history taking, physical exam, discussion of assessment and plan, counseling and patient education is 20 minutes.    Lashonda Jordan MD  Attending Physician  Pager [phone]          Signed Electronically by: Lashonda Jordan MD      Again, thank you for allowing me to participate in the care of your patient.        Sincerely,        Lashonda Jordan MD    Electronically signed

## 2025-05-22 NOTE — PROGRESS NOTES
Jose Martin Colbert is a 74 year old, presenting for the following health issues:  No chief complaint on file.    HPI      Oncology History Overview Note   This is a 73-year-old male with history of coronary artery disease, gout, kidney stones, hypothyroidism comes in with newly diagnosed bladder mucosa-associated lymphoid tissue lymphoma.    He has several years history of kidney stones.  Initially there was incidentally found on CT scan 2 to 4 mm in size.  He has been following up with urology in [place].  Last winter, he had a few episodes of hematuria.  That led to neurologic work-up.  A CT scan was done which showed growing kidney stones. On a routine cystoscopy he was found to have a bladder stone.  He underwent cystoscopy, right ureteroscopy with thulium fiber laser lithotripsy right retrograde pyelogram, right ureteral stent placement, bladder stone removal.  On cystoscopy about 2.5 cm sessile bladder tumor was found.  Which was resected until muscle layer for concern of bladder cancer.    Pathology of bladder mass showed atypical lymphoplasmacytic infiltrates with lambda restricted plasma cells suggestive of mucosa-associated lymphoid tissue lymphoma.  BCGR is negative and so is MYD88 mutation. BCGR is sensitive to detect clonal B cell process in about 88% of cases, rendering 12% clonal processes undetectable.     PET scan does not show any hypermetabolic lesion concerning for lymphoma.      No history exists.     Patient comes today for follow up. No fevers, chills, night sweats or weight loss, no lymphadenopathy. No pain.        Objective    There were no vitals taken for this visit.  There is no height or weight on file to calculate BMI.  Physical Exam   GENERAL:  Alert oriented well nourished  HEAD: normocephalic atraumatic  SKIN:  no rash, hives, other lesions.  LYMPHATIC: no abnormal lymph nodes palable in cervical, axillary, supraclavicular or inguinal area.  RESP:  No rales or rhonchi, breath  sounds bilaterally equal and vesicular  CV:  No tachycardia, S1 S2 normal No murmur.  GI:  Abdomen soft nontender no hepato or splenomegaly  MUSCULOSKELETAL:  No visible joint redness or swelling.  NEURO:  No gross weakness gait normal  PSYCH: pleasant affect    Labs: I personally reviewed complete blood count, differential, renal function test, liver function tests LDH.  No cytopenias, LFTs within normal limits, renal function test within normal limits and LDH is normal as well.    Assessment and Plan:    1) Bladder mucosa associated lymphoid tissue lymphoma:  - He has no clinical evidence of lymphoma recurrence or progression.   - I will see him in a year with labs.  - He sees a urologist regularly, he had 3 negative cystoscopies in a row and now will have annual cystoscopies. This is a good plan that will serve lymphoma surveillance well.    Total time spent on date of service in review of medical records, review of labs, history taking, physical exam, discussion of assessment and plan, counseling and patient education is 20 minutes.    Lashonda Jordan MD  Attending Physician  Pager [phone]          Signed Electronically by: Lashonda Jordan MD

## 2025-05-22 NOTE — NURSING NOTE
"Oncology Rooming Note    May 22, 2025 11:08 AM   Filemon Leon is a 74 year old male who presents for:    Chief Complaint   Patient presents with    Oncology Clinic Visit     MALT (mucosa associated lymphoid tissue)     Initial Vitals: /76 (BP Location: Right arm, Patient Position: Sitting, Cuff Size: Adult Regular)   Pulse 58   Temp 97.5  F (36.4  C) (Oral)   Resp 18   Ht 1.76 m (5' 9.31\")   Wt 78.5 kg (173 lb)   SpO2 99%   BMI 25.32 kg/m   Estimated body mass index is 25.32 kg/m  as calculated from the following:    Height as of this encounter: 1.76 m (5' 9.31\").    Weight as of this encounter: 78.5 kg (173 lb). Body surface area is 1.96 meters squared.  No Pain (0) Comment: Data Unavailable   No LMP for male patient.  Allergies reviewed: Yes  Medications reviewed: Yes    Medications: Medication refills not needed today.  Pharmacy name entered into [facility]:    [id number] - [place], [place] - [address]  AT [facility] [address]  [facility] #[id number] - [place], [place] - [address] [facility] [address] [facility] [facility] [address] [facility] [address] [facility] [place] [place] [facility] [address]    Frailty Screening:   Is the patient here for a new oncology consult visit in cancer care? 2. No    PHQ9:  Did this patient require a PHQ9?: No      Clinical concerns: Patient had a cystoscopy 6 months ago with his urologist in [place] and they stated that they will switch to annual cystoscopies instead of twice a year.       Amie Esteban              "

## 2025-06-27 ENCOUNTER — DOCUMENTATION ONLY (OUTPATIENT)
Dept: LAB | Facility: CLINIC | Age: 75
End: 2025-06-27

## 2025-06-27 DIAGNOSIS — Z87.448 HISTORY OF BLADDER STONE: ICD-10-CM

## 2025-06-27 DIAGNOSIS — R97.20 ELEVATED PROSTATE SPECIFIC ANTIGEN (PSA): Primary | ICD-10-CM

## 2025-07-13 ENCOUNTER — HOSPITAL ENCOUNTER (OUTPATIENT)
Dept: CT IMAGING | Facility: CLINIC | Age: 75
Discharge: HOME | End: 2025-07-13
Attending: STUDENT IN AN ORGANIZED HEALTH CARE EDUCATION/TRAINING PROGRAM | Admitting: STUDENT IN AN ORGANIZED HEALTH CARE EDUCATION/TRAINING PROGRAM
Payer: MEDICARE

## 2025-07-13 DIAGNOSIS — Z87.448 HISTORY OF BLADDER STONE: ICD-10-CM

## 2025-07-13 PROCEDURE — 74176 CT ABD & PELVIS W/O CONTRAST: CPT

## 2025-07-17 ENCOUNTER — OFFICE VISIT (OUTPATIENT)
Dept: UROLOGY | Facility: CLINIC | Age: 75
End: 2025-07-17
Payer: MEDICARE

## 2025-07-17 VITALS — SYSTOLIC BLOOD PRESSURE: 144 MMHG | DIASTOLIC BLOOD PRESSURE: 79 MMHG

## 2025-07-17 DIAGNOSIS — R39.14 BENIGN PROSTATIC HYPERPLASIA WITH INCOMPLETE BLADDER EMPTYING: ICD-10-CM

## 2025-07-17 DIAGNOSIS — R97.20 ELEVATED PROSTATE SPECIFIC ANTIGEN (PSA): Primary | ICD-10-CM

## 2025-07-17 DIAGNOSIS — N40.1 BENIGN PROSTATIC HYPERPLASIA WITH INCOMPLETE BLADDER EMPTYING: ICD-10-CM

## 2025-07-17 DIAGNOSIS — C88.40 MALT (MUCOSA ASSOCIATED LYMPHOID TISSUE) (H): ICD-10-CM

## 2025-07-17 DIAGNOSIS — N20.0 KIDNEY STONE ON LEFT SIDE: ICD-10-CM

## 2025-07-17 LAB
ALBUMIN UR-MCNC: NEGATIVE MG/DL
APPEARANCE UR: CLEAR
BILIRUB UR QL STRIP: NEGATIVE
COLOR UR AUTO: YELLOW
GLUCOSE UR STRIP-MCNC: NEGATIVE MG/DL
HGB UR QL STRIP: NEGATIVE
KETONES UR STRIP-MCNC: NEGATIVE MG/DL
LEUKOCYTE ESTERASE UR QL STRIP: ABNORMAL
NITRATE UR QL: NEGATIVE
PH UR STRIP: 7 [PH] (ref 5–7)
RESIDUAL VOLUME (RV) (EXTERNAL): 148
SP GR UR STRIP: 1.01 (ref 1–1.03)
UROBILINOGEN UR STRIP-ACNC: 0.2 E.U./DL

## 2025-07-17 ASSESSMENT — PAIN SCALES - GENERAL: PAINLEVEL_OUTOF10: NO PAIN (0)

## 2025-07-17 NOTE — LETTER
7/17/2025       RE: Filemon Leon  [address]  [address]  [facility] [place] [facility] [address]     Dear Colleague,    Thank you for referring your patient, Filemon Leon, to the [facility] [place] [facility]. Please see a copy of my visit note below.    CHIEF COMPLAINT   Filemon Leon who is a 74 year old male returns today for follow-up of h/o elevated PSA w/ negative biopsy 2021, bladder tumor, bladder stone, right renal stones s/p TURBT, bladder stone extraction and right URS with stent placement 7/20/2023, found to have atypical lymphoplasmacytic infiltrate on bladder pathology     HPI   Filemon Leon is a 74 year old male returns today for follow-up of h/o elevated PSA w/ negative biopsy 2021, bladder tumor, bladder stone, right renal stones s/p TURBT, bladder stone extraction and right URS with stent placement 7/20/2023, found to have atypical lymphoplasmacytic infiltrate on bladder pathology treated as MALT)    Last seen in 2023 by me. He also follows with a urologist in [place] who recommended continuing to follow his elevated PSA. He has been getting routine surveillance cystoscopies by this urologist to follow his MALT    His PSA on 4/25/2025 was significantly elevated to 10.2 ng/ml. A recent check shows this PSA has come down to 6.75 ng/ml. Patient notes that his PSA does typically run in the 4 to 6 range recently (I do not have all the records of his outside urologist in [place] however)    PHYSICAL EXAM  Patient is a 74 year old  male   Vitals: Blood pressure (!) 144/79.  There is no height or weight on file to calculate BMI.  General Appearance Adult:   Alert, no acute distress, oriented  HENT: throat/mouth:normal, good dentition  Lungs: no respiratory distress, or pursed lip breathing  Heart: No obvious jugular venous distension present  Abdomen: nondistended  Musculoskeltal: extremities normal, no peripheral edema  Skin: no  suspicious lesions or rashes  Neuro: Alert, oriented, speech and mentation normal  Psych: affect and mood normal  Gait: Normal  : 60 g benign feeling prostate    All pertinent imaging reviewed:    Ct a/p 7/13/2025                                                  IMPRESSION:   1.  No bladder calculi. There is a 2 mm nonobstructing left intrarenal calculus.         Reviewed and interpreted personally  Punctate left lower pole stone, no other renal stones    Component      Latest Ref Rng 7/11/2025  9:43 AM   PSA Tumor Marker      0.00 - 6.50 ng/mL 6.75 (H)       Legend:  (H) High    Component      Latest Ref Rng 7/17/2025  10:01 AM   Color Urine      Colorless, Straw, Light Yellow, Yellow  Yellow    Appearance Urine      Clear  Clear    Glucose Urine      Negative mg/dL Negative    Bilirubin Urine      Negative  Negative    Ketones Urine      Negative mg/dL Negative    Specific Gravity Urine      1.003 - 1.035  1.015    Blood Urine      Negative  Negative    pH Urine      5.0 - 7.0  7.0    Protein Albumin Urine      Negative mg/dL Negative    Urobilinogen Urine      0.2, 1.0 E.U./dL 0.2    Nitrite Urine      Negative  Negative    Leukocyte Esterase Urine      Negative  Trace !       Legend:  ! Abnormal     ml    ASSESSMENT and PLAN  74 year old male returns today for follow-up of h/o elevated PSA w/ negative biopsy 2021, bladder tumor, bladder stone, right renal stones s/p TURBT, bladder stone extraction and right URS with stent placement 7/20/2023, found to have atypical lymphoplasmacytic infiltrate on bladder pathology treated as MALT)    Re: elevated PSA was as high as 10 earlier this year but back down into high 6 range (apparently just above his prior baseline). Benign SATNAM. Will hold off on further prostate MRI or biopsy at this time, he can have his PSA rechecked by his [place] urologist when he returns there this winter.    Re: nephrolithiasis: tiny left sided kidney stone, no intervention for this    Re:  BPH with incomplete bladder emptying, PVR today 148 ml. He is not on any alpha blocker. He declines starting any medications at this time. He is worried about balance issues with his peripheral neuropathy    Re: bladder MALT, continue annual surveillance cysto with his primary urologist in [place]        Saran Fairchild MD   [facility]  [facility] Phone: [phone]      Again, thank you for allowing me to participate in the care of your patient.      Sincerely,    Saran Fairchild MD

## 2025-07-17 NOTE — NURSING NOTE
"Chief Complaint   Patient presents with    Kidney Stone Related    Elevated PSA     Pt has another urologist he sees in [place] who follows his PSA, but recommended he continue to follow up here with us while in [place].  Pt states he has not had a SATNAM in a few years.  Biopsy done a few years ago.    Pt does not take his tamsulosin, he states he \"did not need it\"    Pt has no concerns with urination.  Pt denies gross hematuria or dysuria.    PVR: 148 mL by bladder scan      Nohemi Howell, Clinic Assistant    "

## 2025-07-17 NOTE — PROGRESS NOTES
CHIEF COMPLAINT   Filemon Leon who is a 74 year old male returns today for follow-up of h/o elevated PSA w/ negative biopsy 2021, bladder tumor, bladder stone, right renal stones s/p TURBT, bladder stone extraction and right URS with stent placement 7/20/2023, found to have atypical lymphoplasmacytic infiltrate on bladder pathology     HPI   Filemon Leon is a 74 year old male returns today for follow-up of h/o elevated PSA w/ negative biopsy 2021, bladder tumor, bladder stone, right renal stones s/p TURBT, bladder stone extraction and right URS with stent placement 7/20/2023, found to have atypical lymphoplasmacytic infiltrate on bladder pathology treated as MALT)    Last seen in 2023 by me. He also follows with a urologist in [place] who recommended continuing to follow his elevated PSA. He has been getting routine surveillance cystoscopies by this urologist to follow his MALT    His PSA on 4/25/2025 was significantly elevated to 10.2 ng/ml. A recent check shows this PSA has come down to 6.75 ng/ml. Patient notes that his PSA does typically run in the 4 to 6 range recently (I do not have all the records of his outside urologist in [place] however)    PHYSICAL EXAM  Patient is a 74 year old  male   Vitals: Blood pressure (!) 144/79.  There is no height or weight on file to calculate BMI.  General Appearance Adult:   Alert, no acute distress, oriented  HENT: throat/mouth:normal, good dentition  Lungs: no respiratory distress, or pursed lip breathing  Heart: No obvious jugular venous distension present  Abdomen: nondistended  Musculoskeltal: extremities normal, no peripheral edema  Skin: no suspicious lesions or rashes  Neuro: Alert, oriented, speech and mentation normal  Psych: affect and mood normal  Gait: Normal  : 60 g benign feeling prostate    All pertinent imaging reviewed:    Ct a/p 7/13/2025                                                  IMPRESSION:   1.  No bladder calculi. There is a 2 mm  nonobstructing left intrarenal calculus.         Reviewed and interpreted personally  Punctate left lower pole stone, no other renal stones    Component      Latest Ref Rng 7/11/2025  9:43 AM   PSA Tumor Marker      0.00 - 6.50 ng/mL 6.75 (H)       Legend:  (H) High    Component      Latest Ref Rng 7/17/2025  10:01 AM   Color Urine      Colorless, Straw, Light Yellow, Yellow  Yellow    Appearance Urine      Clear  Clear    Glucose Urine      Negative mg/dL Negative    Bilirubin Urine      Negative  Negative    Ketones Urine      Negative mg/dL Negative    Specific Gravity Urine      1.003 - 1.035  1.015    Blood Urine      Negative  Negative    pH Urine      5.0 - 7.0  7.0    Protein Albumin Urine      Negative mg/dL Negative    Urobilinogen Urine      0.2, 1.0 E.U./dL 0.2    Nitrite Urine      Negative  Negative    Leukocyte Esterase Urine      Negative  Trace !       Legend:  ! Abnormal     ml    ASSESSMENT and PLAN  74 year old male returns today for follow-up of h/o elevated PSA w/ negative biopsy 2021, bladder tumor, bladder stone, right renal stones s/p TURBT, bladder stone extraction and right URS with stent placement 7/20/2023, found to have atypical lymphoplasmacytic infiltrate on bladder pathology treated as MALT)    Re: elevated PSA was as high as 10 earlier this year but back down into high 6 range (apparently just above his prior baseline). Benign SATNAM. Will hold off on further prostate MRI or biopsy at this time, he can have his PSA rechecked by his [place] urologist when he returns there this winter.    Re: nephrolithiasis: tiny left sided kidney stone, no intervention for this    Re: BPH with incomplete bladder emptying, PVR today 148 ml. He is not on any alpha blocker. He declines starting any medications at this time. He is worried about balance issues with his peripheral neuropathy    Re: bladder MALT, continue annual surveillance cysto with his primary urologist in [place]        Saran Fairchild MD    [facility]  [facility] Phone: [phone]

## (undated) DEVICE — GUIDEWIRE URO STR STIFF .035"X150CM NITINOL 150NSS35

## (undated) DEVICE — Device

## (undated) DEVICE — CATH FOLEY COUDE TIEMAN 20FR 5ML LUBRICATH LATEX 0102L20

## (undated) DEVICE — LINEN HALF SHEET 5512

## (undated) DEVICE — LINEN FULL SHEET 5511

## (undated) DEVICE — GLOVE BIOGEL PI MICRO INDICATOR UNDERGLOVE SZ 7.0 48970

## (undated) DEVICE — PAD CHUX UNDERPAD 30X36" P3036C

## (undated) DEVICE — PACK CYSTO CUSTOM RIDGES

## (undated) DEVICE — ESU GROUND PAD ADULT W/CORD E7507

## (undated) DEVICE — LINEN TOWEL PACK X5 5464

## (undated) DEVICE — SOL NACL 0.9% IRRIG 3000ML BAG 2B7477

## (undated) DEVICE — SHEATH URETERAL ACCESS NAVIGATOR HD 11/13FRX46CM M0062502230

## (undated) DEVICE — SOL WATER IRRIG 3000ML BAG 2B7117

## (undated) DEVICE — SOL WATER IRRIG 1000ML BOTTLE 2F7114

## (undated) DEVICE — GLOVE BIOGEL PI MICRO SZ 7.0 48570

## (undated) DEVICE — SYR 70ML TOOMEY 041170

## (undated) DEVICE — EVACUATOR BLADDER UROVAC LATEX M0067301250

## (undated) DEVICE — CATH HOLDER STRAP 36600

## (undated) DEVICE — DRSG KERLIX SUPER SPONGE 6X6.75" 2585

## (undated) DEVICE — ESU ELEC LOOP 24FR 20750G

## (undated) DEVICE — BASKET NITINOL TIPLESS HALO  1.5FRX120CM 554120

## (undated) DEVICE — RAD RX ISOVUE 300 (50ML) 61% IOPAMIDOL CHARGE PER ML

## (undated) DEVICE — PREP SCRUB SOL EXIDINE 4% CHG 4OZ 29002-404

## (undated) DEVICE — CATH URETERAL FLEX TIP TIGERTAIL 06FRX70CM 139006

## (undated) DEVICE — BAG CLEAR TRASH 1.3M 39X33" P4040C

## (undated) DEVICE — DRSG TELFA 2X3"

## (undated) DEVICE — COVER FOOTSWITCH W/CINCH 20X24" 923267

## (undated) DEVICE — VALVE ENDOSCOPIC UROSEAL OD9- FR 1 HAND ADJUSTABLE Y PORT LA

## (undated) DEVICE — TUBING IRRIG TUR Y TYPE 96" LF 6543-01

## (undated) DEVICE — FIBER LASER 200 UM DISPOSABLE TFL-FBX200S

## (undated) RX ORDER — CEFAZOLIN SODIUM/WATER 2 G/20 ML
SYRINGE (ML) INTRAVENOUS
Status: DISPENSED
Start: 2023-07-20

## (undated) RX ORDER — FUROSEMIDE 10 MG/ML
INJECTION INTRAMUSCULAR; INTRAVENOUS
Status: DISPENSED
Start: 2023-07-20

## (undated) RX ORDER — DEXAMETHASONE SODIUM PHOSPHATE 4 MG/ML
INJECTION, SOLUTION INTRA-ARTICULAR; INTRALESIONAL; INTRAMUSCULAR; INTRAVENOUS; SOFT TISSUE
Status: DISPENSED
Start: 2023-07-20

## (undated) RX ORDER — FENTANYL CITRATE 50 UG/ML
INJECTION, SOLUTION INTRAMUSCULAR; INTRAVENOUS
Status: DISPENSED
Start: 2023-07-20

## (undated) RX ORDER — PROPOFOL 10 MG/ML
INJECTION, EMULSION INTRAVENOUS
Status: DISPENSED
Start: 2023-07-20

## (undated) RX ORDER — GLYCOPYRROLATE 0.2 MG/ML
INJECTION INTRAMUSCULAR; INTRAVENOUS
Status: DISPENSED
Start: 2023-07-20

## (undated) RX ORDER — ONDANSETRON 2 MG/ML
INJECTION INTRAMUSCULAR; INTRAVENOUS
Status: DISPENSED
Start: 2023-07-20

## (undated) RX ORDER — LIDOCAINE HYDROCHLORIDE 10 MG/ML
INJECTION, SOLUTION EPIDURAL; INFILTRATION; INTRACAUDAL; PERINEURAL
Status: DISPENSED
Start: 2023-07-20

## (undated) RX ORDER — EPHEDRINE SULFATE 50 MG/ML
INJECTION, SOLUTION INTRAMUSCULAR; INTRAVENOUS; SUBCUTANEOUS
Status: DISPENSED
Start: 2023-07-20